# Patient Record
Sex: FEMALE | Race: WHITE | NOT HISPANIC OR LATINO | Employment: FULL TIME | URBAN - METROPOLITAN AREA
[De-identification: names, ages, dates, MRNs, and addresses within clinical notes are randomized per-mention and may not be internally consistent; named-entity substitution may affect disease eponyms.]

---

## 2022-07-20 ENCOUNTER — TELEPHONE (OUTPATIENT)
Dept: FAMILY MEDICINE CLINIC | Facility: CLINIC | Age: 32
End: 2022-07-20

## 2022-07-26 ENCOUNTER — OFFICE VISIT (OUTPATIENT)
Dept: FAMILY MEDICINE CLINIC | Facility: CLINIC | Age: 32
End: 2022-07-26
Payer: COMMERCIAL

## 2022-07-26 VITALS
HEART RATE: 96 BPM | BODY MASS INDEX: 26.5 KG/M2 | SYSTOLIC BLOOD PRESSURE: 120 MMHG | OXYGEN SATURATION: 98 % | HEIGHT: 64 IN | TEMPERATURE: 98.3 F | DIASTOLIC BLOOD PRESSURE: 76 MMHG | RESPIRATION RATE: 18 BRPM | WEIGHT: 155.2 LBS

## 2022-07-26 DIAGNOSIS — Z11.3 SCREEN FOR STD (SEXUALLY TRANSMITTED DISEASE): ICD-10-CM

## 2022-07-26 DIAGNOSIS — Z13.6 SCREENING FOR CARDIOVASCULAR, RESPIRATORY, AND GENITOURINARY DISEASES: ICD-10-CM

## 2022-07-26 DIAGNOSIS — Z00.00 WELL ADULT EXAM: Primary | ICD-10-CM

## 2022-07-26 DIAGNOSIS — Z13.83 SCREENING FOR CARDIOVASCULAR, RESPIRATORY, AND GENITOURINARY DISEASES: ICD-10-CM

## 2022-07-26 DIAGNOSIS — Z11.4 SCREENING FOR HIV (HUMAN IMMUNODEFICIENCY VIRUS): ICD-10-CM

## 2022-07-26 DIAGNOSIS — Z13.89 SCREENING FOR CARDIOVASCULAR, RESPIRATORY, AND GENITOURINARY DISEASES: ICD-10-CM

## 2022-07-26 DIAGNOSIS — G47.00 INSOMNIA, UNSPECIFIED TYPE: ICD-10-CM

## 2022-07-26 DIAGNOSIS — F10.10 ALCOHOL ABUSE: ICD-10-CM

## 2022-07-26 DIAGNOSIS — Z13.29 SCREENING FOR THYROID DISORDER: ICD-10-CM

## 2022-07-26 DIAGNOSIS — N92.6 MISSED PERIOD: ICD-10-CM

## 2022-07-26 DIAGNOSIS — F17.200 SMOKER: ICD-10-CM

## 2022-07-26 DIAGNOSIS — F19.90 ILLICIT DRUG USE: ICD-10-CM

## 2022-07-26 DIAGNOSIS — Z11.59 NEED FOR HEPATITIS C SCREENING TEST: ICD-10-CM

## 2022-07-26 DIAGNOSIS — Z12.4 SCREENING FOR CERVICAL CANCER: ICD-10-CM

## 2022-07-26 LAB — SL AMB POCT URINE HCG: NORMAL

## 2022-07-26 PROCEDURE — 81025 URINE PREGNANCY TEST: CPT | Performed by: FAMILY MEDICINE

## 2022-07-26 PROCEDURE — 3725F SCREEN DEPRESSION PERFORMED: CPT | Performed by: FAMILY MEDICINE

## 2022-07-26 PROCEDURE — 99385 PREV VISIT NEW AGE 18-39: CPT | Performed by: FAMILY MEDICINE

## 2022-07-26 RX ORDER — DIPHENHYDRAMINE HCL 25 MG
25 CAPSULE ORAL EVERY 6 HOURS PRN
COMMUNITY

## 2022-07-26 RX ORDER — TRAZODONE HYDROCHLORIDE 50 MG/1
50 TABLET ORAL
Qty: 30 TABLET | Refills: 0 | Status: SHIPPED | OUTPATIENT
Start: 2022-07-26

## 2022-07-26 NOTE — PROGRESS NOTES
FAMILY PRACTICE HEALTH MAINTENANCE OFFICE VISIT  [facility]    NAME: Daniel Gottlieb  AGE: 33 y o  SEX: female  : 1990     DATE: 2022    Assessment and Plan     1  Well adult exam    2  Need for hepatitis C screening test  -     Hepatitis C Antibody ([facility]); Future    3  Screening for HIV (human immunodeficiency virus)  -     [facility]- Human Immunodeficiency Virus 1/2 Antigen / Antibody ( Fourth Generation) with Reflex Testing; Future    4  Screening for cardiovascular, respiratory, and genitourinary diseases  -     CBC and differential; Future  -     Comprehensive metabolic panel; Future  -     Lipid Panel with Direct LDL reflex; Future    5  Screening for thyroid disorder  -     TSH, 3rd generation with Free T4 reflex; Future    6  Missed period  -     POCT urine HCG  -     Ambulatory referral to Obstetrics / Gynecology; Future    7  Screening for cervical cancer  -     Thinprep Pap and HR HPV DNA    8  Smoker  Assessment & Plan:  Smokes half pack/day  Not ready to quit  9  Screen for STD (sexually transmitted disease)  -     Chlamydia/GC amplified DNA by PCR; Future  -     Hepatitis B surface antigen; Future  -     HSV TYPE 1,2 DNA PCR; Future  -     RPR; Future    10  Alcohol abuse  Comments:  States she drinks 4 shots of liquor every night to sleep  Counseled on cessation  Will trial trazodone for insomnia as stated below  11  Insomnia, unspecified type  -     traZODone (DESYREL) 50 mg tablet; Take 1 tablet (50 mg total) by mouth daily at bedtime    12  Illicit drug use  Comments:  Uses edibles frequently  Counseled on cessation           Patient Counseling:   Nutrition: Stressed importance of a well balanced diet, moderation of sodium/saturated fat, caloric balance and sufficient intake of fiber  Exercise: Stressed the importance of regular exercise with a goal of 150 minutes per week  Dental Health: Discussed daily flossing and brushing and regular dental visits   Immunizations reviewed: Risks and Benefits discussed and Declined recommended vaccinations  Discussed benefits of:  Cervical Cancer screening and Screening labs  BMI Counseling: Body mass index is 26 64 kg/m²  Discussed with patient's BMI with her  The BMI is above normal  Nutrition recommendations include reducing portion sizes, decreasing overall calorie intake, 3-5 servings of fruits/vegetables daily, reducing fast food intake and consuming healthier snacks  Exercise recommendations include moderate aerobic physical activity for 150 minutes/week  No follow-ups on file  Chief Complaint     Chief Complaint   Patient presents with   Gabi Mckeon         History of Present Illness     HPI    Well Adult Physical   Patient here for a comprehensive physical exam       Diet and Physical Activity  Diet: well balanced diet  Exercise: daily      Depression Screen  PHQ-2/9 Depression Screening    Little interest or pleasure in doing things: 0 - not at all  Feeling down, depressed, or hopeless: 0 - not at all  PHQ-2 Score: 0  PHQ-2 Interpretation: Negative depression screen          General Health  Hearing: Normal:  bilateral  Vision: no vision problems  Dental: no dental visits for >1 year, brushes teeth twice daily and does not floss    Reproductive Health  Irregular Periods      The following portions of the patient's history were reviewed and updated as appropriate: allergies, current medications, past family history, past medical history, past social history, past surgical history and problem list     Review of Systems     Review of Systems   Constitutional: Negative  HENT: Negative  Eyes: Negative  Respiratory: Negative  Cardiovascular: Negative  Gastrointestinal: Negative  Endocrine: Negative  Genitourinary: Negative  Musculoskeletal: Negative  Skin: Negative  Allergic/Immunologic: Negative      Neurological: Negative  Hematological: Negative  Psychiatric/Behavioral: Positive for sleep disturbance  Past Medical History     History reviewed  No pertinent past medical history  Past Surgical History     History reviewed  No pertinent surgical history  Social History     Social History     Socioeconomic History    Marital status:      Spouse name: None    Number of children: None    Years of education: None    Highest education level: None   Occupational History    None   Tobacco Use    Smoking status: Current Every Day Smoker    Smokeless tobacco: Never Used   Substance and Sexual Activity    Alcohol use: Yes     Comment: daily    Drug use: Yes     Types: Other     Comment: edibles    Sexual activity: Not Currently   Other Topics Concern    None   Social History Narrative    None     Social Determinants of Health     Financial Resource Strain: Not on file   Food Insecurity: Not on file   Transportation Needs: Not on file   Physical Activity: Not on file   Stress: Not on file   Social Connections: Not on file   Intimate Partner Violence: Not on file   Housing Stability: Not on file       Family History     History reviewed  No pertinent family history  Current Medications       Current Outpatient Medications:     diphenhydrAMINE (BENADRYL) 25 mg capsule, Take 25 mg by mouth every 6 (six) hours as needed for itching, Disp: , Rfl:     NON FORMULARY, Clenbuterol, Disp: , Rfl:     NON FORMULARY, Anabar, Disp: , Rfl:     traZODone (DESYREL) 50 mg tablet, Take 1 tablet (50 mg total) by mouth daily at bedtime, Disp: 30 tablet, Rfl: 0     Allergies     No Known Allergies    Objective     /76   Pulse 96   Temp 98 3 °F (36 8 °C)   Resp 18   Ht 5' 4" (1 626 m)   Wt 70 4 kg (155 lb 3 2 oz)   LMP  (LMP Unknown)   SpO2 98%   BMI 26 64 kg/m²      Physical Exam  Constitutional:       General: She is not in acute distress  Appearance: Normal appearance  She is well-developed   She is not diaphoretic  HENT:      Head: Normocephalic and atraumatic  Right Ear: Tympanic membrane, ear canal and external ear normal  There is no impacted cerumen  Left Ear: Tympanic membrane, ear canal and external ear normal  There is no impacted cerumen  Eyes:      General: No scleral icterus  Right eye: No discharge  Left eye: No discharge  Extraocular Movements: Extraocular movements intact  Conjunctiva/sclera: Conjunctivae normal       Pupils: Pupils are equal, round, and reactive to light  Cardiovascular:      Rate and Rhythm: Normal rate and regular rhythm  Heart sounds: Normal heart sounds  No murmur heard  No friction rub  No gallop  Pulmonary:      Effort: Pulmonary effort is normal  No respiratory distress  Breath sounds: Normal breath sounds  No wheezing or rales  Chest:      Chest wall: No tenderness  Abdominal:      General: Bowel sounds are normal  There is no distension  Palpations: Abdomen is soft  There is no mass  Tenderness: There is no abdominal tenderness  There is no guarding or rebound  Musculoskeletal:         General: No deformity  Normal range of motion  Cervical back: Normal range of motion and neck supple  Skin:     General: Skin is warm and dry  Findings: No erythema or rash  Neurological:      Mental Status: She is alert and oriented to person, place, and time  Psychiatric:         Behavior: Behavior normal          Thought Content:  Thought content normal          Judgment: Judgment normal             Visual Acuity Screening    Right eye Left eye Both eyes   Without correction:      With correction: 20/15 20/20 20/50           MD K[facility]ARA [facility] DEPT  OF [facility]

## 2022-08-01 LAB
CLINICAL INFO: NORMAL
CYTO CVX: NORMAL
DATE PREVIOUS BX: NORMAL
HPV I/H RISK 1 DNA CVX QL PROBE+SIG AMP: NOT DETECTED
LMP START DATE: NORMAL
SL AMB PREV. PAP:: NORMAL
SPECIMEN SOURCE CVX/VAG CYTO: NORMAL

## 2022-08-08 LAB
ALBUMIN SERPL-MCNC: 4.7 G/DL (ref 3.8–4.8)
ALBUMIN/GLOB SERPL: 1.7 {RATIO} (ref 1.2–2.2)
ALP SERPL-CCNC: 37 IU/L (ref 44–121)
ALT SERPL-CCNC: 36 IU/L (ref 0–32)
AST SERPL-CCNC: 58 IU/L (ref 0–40)
BASOPHILS # BLD AUTO: 0.1 X10E3/UL (ref 0–0.2)
BASOPHILS NFR BLD AUTO: 1 %
BILIRUB SERPL-MCNC: 0.4 MG/DL (ref 0–1.2)
BUN SERPL-MCNC: 15 MG/DL (ref 6–20)
BUN/CREAT SERPL: 13 (ref 9–23)
C TRACH RRNA SPEC QL NAA+PROBE: NEGATIVE
CALCIUM SERPL-MCNC: 9.8 MG/DL (ref 8.7–10.2)
CHLORIDE SERPL-SCNC: 101 MMOL/L (ref 96–106)
CHOLEST SERPL-MCNC: 200 MG/DL (ref 100–199)
CO2 SERPL-SCNC: 23 MMOL/L (ref 20–29)
CREAT SERPL-MCNC: 1.19 MG/DL (ref 0.57–1)
EGFR: 63 ML/MIN/1.73
EOSINOPHIL # BLD AUTO: 0.1 X10E3/UL (ref 0–0.4)
EOSINOPHIL NFR BLD AUTO: 2 %
ERYTHROCYTE [DISTWIDTH] IN BLOOD BY AUTOMATED COUNT: 12.7 % (ref 11.7–15.4)
GLOBULIN SER-MCNC: 2.8 G/DL (ref 1.5–4.5)
GLUCOSE SERPL-MCNC: 86 MG/DL (ref 65–99)
HBV SURFACE AG SERPL QL IA: NEGATIVE
HCT VFR BLD AUTO: 45.2 % (ref 34–46.6)
HCV AB S/CO SERPL IA: <0.1 S/CO RATIO (ref 0–0.9)
HDLC SERPL-MCNC: 36 MG/DL
HGB BLD-MCNC: 15.1 G/DL (ref 11.1–15.9)
HIV 1+2 AB+HIV1 P24 AG SERPL QL IA: NON REACTIVE
HSV1 DNA SPEC QL NAA+PROBE: NEGATIVE
HSV2 DNA SPEC QL NAA+PROBE: NEGATIVE
IMM GRANULOCYTES # BLD: 0 X10E3/UL (ref 0–0.1)
IMM GRANULOCYTES NFR BLD: 0 %
LDLC SERPL CALC-MCNC: 153 MG/DL (ref 0–99)
LYMPHOCYTES # BLD AUTO: 2.4 X10E3/UL (ref 0.7–3.1)
LYMPHOCYTES NFR BLD AUTO: 28 %
MCH RBC QN AUTO: 31.5 PG (ref 26.6–33)
MCHC RBC AUTO-ENTMCNC: 33.4 G/DL (ref 31.5–35.7)
MCV RBC AUTO: 94 FL (ref 79–97)
MICRODELETION SYND BLD/T FISH: NORMAL
MONOCYTES # BLD AUTO: 0.8 X10E3/UL (ref 0.1–0.9)
MONOCYTES NFR BLD AUTO: 10 %
N GONORRHOEA RRNA SPEC QL NAA+PROBE: NEGATIVE
NEUTROPHILS # BLD AUTO: 5 X10E3/UL (ref 1.4–7)
NEUTROPHILS NFR BLD AUTO: 59 %
PLATELET # BLD AUTO: 330 X10E3/UL (ref 150–450)
POTASSIUM SERPL-SCNC: 4.7 MMOL/L (ref 3.5–5.2)
PROT SERPL-MCNC: 7.5 G/DL (ref 6–8.5)
RBC # BLD AUTO: 4.79 X10E6/UL (ref 3.77–5.28)
RPR SER QL: NON REACTIVE
SODIUM SERPL-SCNC: 139 MMOL/L (ref 134–144)
TRIGL SERPL-MCNC: 63 MG/DL (ref 0–149)
TSH SERPL DL<=0.005 MIU/L-ACNC: 0.85 UIU/ML (ref 0.45–4.5)
WBC # BLD AUTO: 8.4 X10E3/UL (ref 3.4–10.8)

## 2022-08-09 ENCOUNTER — TELEPHONE (OUTPATIENT)
Dept: FAMILY MEDICINE CLINIC | Facility: CLINIC | Age: 32
End: 2022-08-09

## 2022-08-10 ENCOUNTER — TELEPHONE (OUTPATIENT)
Dept: FAMILY MEDICINE CLINIC | Facility: CLINIC | Age: 32
End: 2022-08-10

## 2022-11-24 ENCOUNTER — HOSPITAL ENCOUNTER (EMERGENCY)
Facility: HOSPITAL | Age: 32
Discharge: HOME | End: 2022-11-24
Attending: EMERGENCY MEDICINE

## 2022-11-24 ENCOUNTER — APPOINTMENT (EMERGENCY)
Dept: RADIOLOGY | Facility: HOSPITAL | Age: 32
End: 2022-11-24

## 2022-11-24 VITALS
BODY MASS INDEX: 25.69 KG/M2 | OXYGEN SATURATION: 97 % | RESPIRATION RATE: 18 BRPM | HEART RATE: 72 BPM | HEIGHT: 63 IN | WEIGHT: 145 LBS | TEMPERATURE: 99.7 F | DIASTOLIC BLOOD PRESSURE: 69 MMHG | SYSTOLIC BLOOD PRESSURE: 129 MMHG

## 2022-11-24 DIAGNOSIS — F10.10 ALCOHOL ABUSE: ICD-10-CM

## 2022-11-24 DIAGNOSIS — E83.42 HYPOMAGNESEMIA: ICD-10-CM

## 2022-11-24 DIAGNOSIS — F10.939 ALCOHOL WITHDRAWAL (HCC): Primary | ICD-10-CM

## 2022-11-24 LAB
ALBUMIN SERPL BCP-MCNC: 3.4 G/DL (ref 3.5–5)
ALP SERPL-CCNC: 75 U/L (ref 46–116)
ALT SERPL W P-5'-P-CCNC: 67 U/L (ref 12–78)
AMPHETAMINES SERPL QL SCN: NEGATIVE
ANION GAP SERPL CALCULATED.3IONS-SCNC: 6 MMOL/L (ref 4–13)
APTT PPP: 31 SECONDS (ref 23–37)
BARBITURATES UR QL: NEGATIVE
BASOPHILS # BLD AUTO: 0.03 THOUSANDS/ÂΜL (ref 0–0.1)
BASOPHILS NFR BLD AUTO: 0 % (ref 0–1)
BENZODIAZ UR QL: NEGATIVE
BILIRUB SERPL-MCNC: 0.27 MG/DL (ref 0.2–1)
BUN SERPL-MCNC: 9 MG/DL (ref 5–25)
CALCIUM ALBUM COR SERPL-MCNC: 9 MG/DL (ref 8.3–10.1)
CALCIUM SERPL-MCNC: 8.5 MG/DL (ref 8.3–10.1)
CHLORIDE SERPL-SCNC: 101 MMOL/L (ref 96–108)
CO2 SERPL-SCNC: 31 MMOL/L (ref 21–32)
COCAINE UR QL: NEGATIVE
CREAT SERPL-MCNC: 0.73 MG/DL (ref 0.6–1.3)
EOSINOPHIL # BLD AUTO: 0.06 THOUSAND/ÂΜL (ref 0–0.61)
EOSINOPHIL NFR BLD AUTO: 1 % (ref 0–6)
ERYTHROCYTE [DISTWIDTH] IN BLOOD BY AUTOMATED COUNT: 11.9 % (ref 11.6–15.1)
ETHANOL SERPL-MCNC: 61 MG/DL (ref 0–3)
EXT PREGNANCY TEST URINE: NEGATIVE
EXT. CONTROL: NORMAL
GFR SERPL CREATININE-BSD FRML MDRD: 110 ML/MIN/1.73SQ M
GLUCOSE SERPL-MCNC: 87 MG/DL (ref 65–140)
HCT VFR BLD AUTO: 41.9 % (ref 34.8–46.1)
HGB BLD-MCNC: 13.8 G/DL (ref 11.5–15.4)
IMM GRANULOCYTES # BLD AUTO: 0.02 THOUSAND/UL (ref 0–0.2)
IMM GRANULOCYTES NFR BLD AUTO: 0 % (ref 0–2)
INR PPP: 0.91 (ref 0.84–1.19)
LIPASE SERPL-CCNC: 249 U/L (ref 73–393)
LYMPHOCYTES # BLD AUTO: 2.35 THOUSANDS/ÂΜL (ref 0.6–4.47)
LYMPHOCYTES NFR BLD AUTO: 27 % (ref 14–44)
MAGNESIUM SERPL-MCNC: 1.4 MG/DL (ref 1.6–2.6)
MCH RBC QN AUTO: 32.4 PG (ref 26.8–34.3)
MCHC RBC AUTO-ENTMCNC: 32.9 G/DL (ref 31.4–37.4)
MCV RBC AUTO: 98 FL (ref 82–98)
METHADONE UR QL: NEGATIVE
MONOCYTES # BLD AUTO: 0.8 THOUSAND/ÂΜL (ref 0.17–1.22)
MONOCYTES NFR BLD AUTO: 9 % (ref 4–12)
NEUTROPHILS # BLD AUTO: 5.32 THOUSANDS/ÂΜL (ref 1.85–7.62)
NEUTS SEG NFR BLD AUTO: 63 % (ref 43–75)
NRBC BLD AUTO-RTO: 0 /100 WBCS
OPIATES UR QL SCN: NEGATIVE
OXYCODONE+OXYMORPHONE UR QL SCN: NEGATIVE
PCP UR QL: NEGATIVE
PLATELET # BLD AUTO: 136 THOUSANDS/UL (ref 149–390)
PMV BLD AUTO: 10.3 FL (ref 8.9–12.7)
POTASSIUM SERPL-SCNC: 3.6 MMOL/L (ref 3.5–5.3)
PROT SERPL-MCNC: 7 G/DL (ref 6.4–8.4)
PROTHROMBIN TIME: 12.4 SECONDS (ref 11.6–14.5)
RBC # BLD AUTO: 4.26 MILLION/UL (ref 3.81–5.12)
SODIUM SERPL-SCNC: 138 MMOL/L (ref 135–147)
THC UR QL: NEGATIVE
WBC # BLD AUTO: 8.58 THOUSAND/UL (ref 4.31–10.16)

## 2022-11-24 RX ORDER — MAGNESIUM SULFATE HEPTAHYDRATE 40 MG/ML
2 INJECTION, SOLUTION INTRAVENOUS ONCE
Status: COMPLETED | OUTPATIENT
Start: 2022-11-24 | End: 2022-11-24

## 2022-11-24 RX ORDER — CHLORDIAZEPOXIDE HYDROCHLORIDE 25 MG/1
50 CAPSULE, GELATIN COATED ORAL ONCE
Status: COMPLETED | OUTPATIENT
Start: 2022-11-24 | End: 2022-11-24

## 2022-11-24 RX ORDER — CHLORDIAZEPOXIDE HYDROCHLORIDE 25 MG/1
50 CAPSULE, GELATIN COATED ORAL 3 TIMES DAILY PRN
Qty: 12 CAPSULE | Refills: 0 | Status: SHIPPED | OUTPATIENT
Start: 2022-11-24 | End: 2022-12-06

## 2022-11-24 RX ADMIN — CHLORDIAZEPOXIDE HYDROCHLORIDE 50 MG: 25 CAPSULE ORAL at 20:21

## 2022-11-24 RX ADMIN — MAGNESIUM SULFATE HEPTAHYDRATE 2 G: 40 INJECTION, SOLUTION INTRAVENOUS at 19:19

## 2022-11-24 RX ADMIN — IOHEXOL 100 ML: 350 INJECTION, SOLUTION INTRAVENOUS at 19:11

## 2022-11-24 RX ADMIN — SODIUM CHLORIDE 1000 ML: 0.9 INJECTION, SOLUTION INTRAVENOUS at 19:12

## 2022-11-25 LAB
ATRIAL RATE: 72 BPM
P AXIS: 3 DEGREES
PR INTERVAL: 128 MS
QRS AXIS: 85 DEGREES
QRSD INTERVAL: 92 MS
QT INTERVAL: 434 MS
QTC INTERVAL: 475 MS
T WAVE AXIS: 52 DEGREES
VENTRICULAR RATE: 72 BPM

## 2022-11-25 NOTE — DISCHARGE INSTRUCTIONS
Return to the ER for further concerns or worsening symptoms  Follow up with your primary care physician in 1-2 days  Take medication as prescribed  Follow up with outpatient referrals provided

## 2022-11-25 NOTE — ED PROVIDER NOTES
History  Chief Complaint   Patient presents with   • Withdrawal - Alcohol     States she is in withdrawal from alcohol, last drink being last night     Patient is a 81-year-old female with a history of alcohol dependence  She states she typically has 5 shots of vodka every night  Patient admits to recent 1 week binge, during which she drank alcohol for morning tonight  Most recent drink was last night  Patient now presents with complaint of also withdrawal and is concerned that she might be having a heart attack  Patient thinks her heart might stop  She admits to having some tremors  Patient took 2 tablets of Xanax which she procured from her friend, and admits that her symptoms have improved at the time of initial evaluation  Patient complains of epigastric/bilateral upper quadrant abdominal pain  She states that she is unsure if she fell yesterday while drinking  Patient states that she drinks alcohol because of insomnia would like something to help her sleep  During my initial evaluation, patient refuses inpatient rehab/detox  She states that she is going to enroll in a program from her job I would like to try that first        History provided by:  Patient   used: No    Withdrawal - Alcohol  Similar prior episodes: yes    Severity:  Moderate  Onset quality:  Unable to specify  Timing:  Constant  Chronicity:  New  Suspected agents:  Alcohol  Associated symptoms: abdominal pain    Associated symptoms: no nausea, no shortness of breath and no vomiting    Abdominal pain:     Location:  Epigastric, RUQ and LUQ    Quality: aching      Severity:  Moderate    Onset quality:  Unable to specify    Timing:  Constant    Chronicity:  New      Prior to Admission Medications   Prescriptions Last Dose Informant Patient Reported? Taking?    NON FORMULARY   Yes No   Sig: Clenbuterol   NON FORMULARY   Yes No   Sig: Anabar   diphenhydrAMINE (BENADRYL) 25 mg capsule   Yes No   Sig: Take 25 mg by mouth every 6 (six) hours as needed for itching   traZODone (DESYREL) 50 mg tablet   No No   Sig: Take 1 tablet (50 mg total) by mouth daily at bedtime      Facility-Administered Medications: None       History reviewed  No pertinent past medical history  History reviewed  No pertinent surgical history  History reviewed  No pertinent family history  I have reviewed and agree with the history as documented  E-Cigarette/Vaping     E-Cigarette/Vaping Substances     Social History     Tobacco Use   • Smoking status: Every Day   • Smokeless tobacco: Never   Substance Use Topics   • Alcohol use: Yes     Comment: daily   • Drug use: Yes     Types: Other     Comment: edibles       Review of Systems   Constitutional: Negative for chills and fever  Respiratory: Negative for cough, chest tightness and shortness of breath  Gastrointestinal: Positive for abdominal pain  Negative for diarrhea, nausea and vomiting  Genitourinary: Negative for dysuria, frequency, hematuria and urgency  Musculoskeletal: Negative for back pain, neck pain and neck stiffness  Neurological: Positive for tremors  Psychiatric/Behavioral: The patient is nervous/anxious  All other systems reviewed and are negative  Physical Exam  Physical Exam  Vitals and nursing note reviewed  Constitutional:       General: She is not in acute distress  Appearance: She is well-developed and well-nourished  She is not diaphoretic  HENT:      Head: Normocephalic and atraumatic  Eyes:      Extraocular Movements: Extraocular movements intact  Conjunctiva/sclera: Conjunctivae normal       Pupils: Pupils are equal, round, and reactive to light  Cardiovascular:      Rate and Rhythm: Normal rate and regular rhythm  Heart sounds: Normal heart sounds  No murmur heard  Pulmonary:      Effort: Pulmonary effort is normal  No respiratory distress  Breath sounds: Normal breath sounds     Abdominal:      General: Bowel sounds are normal  There is no distension  Palpations: Abdomen is soft  Tenderness: There is abdominal tenderness in the right upper quadrant and left upper quadrant  Musculoskeletal:         General: No deformity or edema  Normal range of motion  Cervical back: Normal range of motion and neck supple  Skin:     General: Skin is warm and dry  Capillary Refill: Capillary refill takes less than 2 seconds  Coloration: Skin is not pale  Findings: No rash  Neurological:      General: No focal deficit present  Mental Status: She is alert and oriented to person, place, and time  Cranial Nerves: No cranial nerve deficit     Psychiatric:         Mood and Affect: Mood and affect normal          Behavior: Behavior normal          Vital Signs  ED Triage Vitals   Temperature Pulse Respirations Blood Pressure SpO2   11/24/22 1758 11/24/22 1758 11/24/22 1758 11/24/22 1800 11/24/22 1758   99 7 °F (37 6 °C) 95 18 (!) 131/104 96 %      Temp src Heart Rate Source Patient Position - Orthostatic VS BP Location FiO2 (%)   -- 11/24/22 1911 11/24/22 1911 11/24/22 1911 --    Monitor Sitting Left arm       Pain Score       11/24/22 1758       No Pain           Vitals:    11/24/22 1911 11/24/22 1945 11/24/22 2000 11/24/22 2015   BP: 121/83 117/73  129/69   Pulse: 76 78 76 72   Patient Position - Orthostatic VS: Sitting Lying  Lying         Visual Acuity  Visual Acuity    Flowsheet Row Most Recent Value   L Pupil Size (mm) 3   R Pupil Size (mm) 3          ED Medications  Medications   iohexol (OMNIPAQUE) 350 MG/ML injection (SINGLE-DOSE) 100 mL (100 mL Intravenous Given 11/24/22 1911)   magnesium sulfate 2 g/50 mL IVPB (premix) 2 g (0 g Intravenous Stopped 11/24/22 2025)   sodium chloride 0 9 % bolus 1,000 mL (0 mL Intravenous Stopped 11/24/22 2021)   chlordiazePOXIDE (LIBRIUM) capsule 50 mg (50 mg Oral Given 11/24/22 2021)       Diagnostic Studies  Results Reviewed     Procedure Component Value Units Date/Time Lipase [[id number]]  (Normal) Collected: 11/24/22 1827    Lab Status: Final result Specimen: Blood from Arm, Right Updated: 11/24/22 1910     Lipase 249 u/L     Comprehensive metabolic panel [[id number]]  (Abnormal) Collected: 11/24/22 1827    Lab Status: Final result Specimen: Blood from Arm, Right Updated: 11/24/22 1858     Sodium 138 mmol/L      Potassium 3 6 mmol/L      Chloride 101 mmol/L      CO2 31 mmol/L      ANION GAP 6 mmol/L      BUN 9 mg/dL      Creatinine 0 73 mg/dL      Glucose 87 mg/dL      Calcium 8 5 mg/dL      Corrected Calcium 9 0 mg/dL      AST --     ALT 67 U/L      Alkaline Phosphatase 75 U/L      Total Protein 7 0 g/dL      Albumin 3 4 g/dL      Total Bilirubin 0 27 mg/dL      eGFR 110 ml/min/1 73sq m     Narrative:      [place] guidelines for Chronic Kidney Disease (CKD):   •  Stage 1 with normal or high GFR (GFR > 90 mL/min/1 73 square meters)  •  Stage 2 Mild CKD (GFR = 60-89 mL/min/1 73 square meters)  •  Stage 3A Moderate CKD (GFR = 45-59 mL/min/1 73 square meters)  •  Stage 3B Moderate CKD (GFR = 30-44 mL/min/1 73 square meters)  •  Stage 4 Severe CKD (GFR = 15-29 mL/min/1 73 square meters)  •  Stage 5 End Stage CKD (GFR <15 mL/min/1 73 square meters)  Note: GFR calculation is accurate only with a steady state creatinine    Magnesium [[id number]]  (Abnormal) Collected: 11/24/22 1827    Lab Status: Final result Specimen: Blood from Arm, Right Updated: 11/24/22 1858     Magnesium 1 4 mg/dL     Ethanol [[id number]]  (Abnormal) Collected: 11/24/22 1827    Lab Status: Final result Specimen: Blood from Arm, Right Updated: 11/24/22 1855     Ethanol Lvl 61 mg/dL     Protime-INR [[id number]]  (Normal) Collected: 11/24/22 1827    Lab Status: Final result Specimen: Blood from Arm, Right Updated: 11/24/22 1853     Protime 12 4 seconds      INR 0 91    APTT [[id number]]  (Normal) Collected: 11/24/22 1827    Lab Status: Final result Specimen: Blood from Arm, Right Updated: 11/24/22 1853     PTT 31 seconds     Rapid drug screen, urine [[id number]]  (Normal) Collected: 11/24/22 1827    Lab Status: Final result Specimen: Urine, Clean Catch Updated: 11/24/22 1851     Amph/Meth UR Negative     Barbiturate Ur Negative     Benzodiazepine Urine Negative     Cocaine Urine Negative     Methadone Urine Negative     Opiate Urine Negative     PCP Ur Negative     THC Urine Negative     Oxycodone Urine Negative    Narrative:      FOR MEDICAL PURPOSES ONLY  IF CONFIRMATION NEEDED PLEASE CONTACT THE LAB WITHIN 5 DAYS  Drug Screen Cutoff Levels:  AMPHETAMINE/METHAMPHETAMINES  1000 ng/mL  BARBITURATES     200 ng/mL  BENZODIAZEPINES     200 ng/mL  COCAINE      300 ng/mL  METHADONE      300 ng/mL  OPIATES      300 ng/mL  PHENCYCLIDINE     25 ng/mL  THC       50 ng/mL  OXYCODONE      100 ng/mL    CBC and differential [[id number]]  (Abnormal) Collected: 11/24/22 1827    Lab Status: Final result Specimen: Blood from Arm, Right Updated: 11/24/22 1844     WBC 8 58 Thousand/uL      RBC 4 26 Million/uL      Hemoglobin 13 8 g/dL      Hematocrit 41 9 %      MCV 98 fL      MCH 32 4 pg      MCHC 32 9 g/dL      RDW 11 9 %      MPV 10 3 fL      Platelets 689 Thousands/uL      nRBC 0 /100 WBCs      Neutrophils Relative 63 %      Immat GRANS % 0 %      Lymphocytes Relative 27 %      Monocytes Relative 9 %      Eosinophils Relative 1 %      Basophils Relative 0 %      Neutrophils Absolute 5 32 Thousands/µL      Immature Grans Absolute 0 02 Thousand/uL      Lymphocytes Absolute 2 35 Thousands/µL      Monocytes Absolute 0 80 Thousand/µL      Eosinophils Absolute 0 06 Thousand/µL      Basophils Absolute 0 03 Thousands/µL     POCT pregnancy, urine [[id number]]  (Normal) Resulted: 11/24/22 1829    Lab Status: Final result Updated: 11/24/22 1830     EXT Preg Test, Ur Negative     Control Valid                 CT head without contrast   Final Result by Glory Easley MD (11/24 1920)      No acute intracranial abnormality  Workstation performed: [id number]         CT chest abdomen pelvis w contrast   Final Result by Nick Gold MD (11/24 1924)      No acute traumatic CT findings  Hepatic steatosis  Workstation performed: [id number]                    Procedures  ECG 12 Lead Documentation Only    Date/Time: 11/24/2022 7:20 PM  Performed by: Jenifer Trent DO  Authorized by: Jenifer Trent DO     Indications / Diagnosis:  ETOH abuse  ECG reviewed by me, the ED Provider: yes    Patient location:  ED  Previous ECG:     Previous ECG:  Unavailable    Comparison to cardiac monitor: Yes    Interpretation:     Interpretation: non-specific    Rate:     ECG rate:  72bpm    ECG rate assessment: normal    Rhythm:     Rhythm: sinus rhythm    Ectopy:     Ectopy: none    QRS:     QRS axis:  Normal  Conduction:     Conduction: normal    ST segments:     ST segments:  Normal  T waves:     T waves: normal    Other findings:     Other findings: prolonged qTc interval               ED Course                               SBIRT 20yo+    Flowsheet Row Most Recent Value   SBIRT (25 yo +)    In order to provide better care to our patients, we are screening all of our patients for alcohol and drug use  Would it be okay to ask you these screening questions?  No Filed at: 11/24/2022 2011                    [facility]  Number of Diagnoses or Management Options  Alcohol abuse: new and requires workup  Alcohol withdrawal ([facility] [address] ): new and requires workup  Hypomagnesemia: new and requires workup     Amount and/or Complexity of Data Reviewed  Clinical lab tests: ordered and reviewed  Tests in the radiology section of CPT®: ordered and reviewed    Risk of Complications, Morbidity, and/or Mortality  Presenting problems: high  Diagnostic procedures: high  Management options: high    Patient Progress  Patient progress: improved      Disposition  Final diagnoses:   Alcohol withdrawal ([facility] [address] )   Hypomagnesemia   Alcohol abuse     Time reflects when diagnosis was documented in both MDM as applicable and the Disposition within this note     Time User Action Codes Description Comment    11/24/2022  7:57 PM JAIMEjewels Rios Add [F10 939] Alcohol withdrawal ([address] )     11/24/2022  7:57 PM Milagro Rios Add [E83 42] Hypomagnesemia     11/24/2022  7:57 PM Milagro Rios Add [F10 10] Alcohol abuse       ED Disposition     ED Disposition   Discharge    Condition   Stable    Date/Time   Thu Nov 24, 2022  7:57 PM    Comment   Gabriel Harborton discharge to home/self care  Follow-up Information     Follow up With Specialties Details Why Contact Info    Ayse Purcell MD [facility] Schedule an appointment as soon as possible for a visit in 2 days for follow up [address]  [phone]            Discharge Medication List as of 11/24/2022  8:05 PM      START taking these medications    Details   chlordiazePOXIDE (LIBRIUM) 25 mg capsule Take 2 capsules (50 mg total) by mouth 3 (three) times a day as needed for anxiety or withdrawal for up to 3 days, Starting Thu 11/24/2022, Until Sun 11/27/2022 at 2359, Normal         CONTINUE these medications which have NOT CHANGED    Details   diphenhydrAMINE (BENADRYL) 25 mg capsule Take 25 mg by mouth every 6 (six) hours as needed for itching, Historical Med      !! NON FORMULARY Clenbuterol, Historical Med      !! NON FORMULARY Anabar, Historical Med      traZODone (DESYREL) 50 mg tablet Take 1 tablet (50 mg total) by mouth daily at bedtime, Starting Tue 7/26/2022, Normal       !! - Potential duplicate medications found  Please discuss with provider  No discharge procedures on file      PDMP Review     None          ED Provider  Electronically Signed by           Coral José DO  11/24/22 7699

## 2022-12-06 ENCOUNTER — OFFICE VISIT (OUTPATIENT)
Dept: FAMILY MEDICINE CLINIC | Facility: CLINIC | Age: 32
End: 2022-12-06

## 2022-12-06 VITALS
HEART RATE: 84 BPM | WEIGHT: 147 LBS | BODY MASS INDEX: 26.05 KG/M2 | SYSTOLIC BLOOD PRESSURE: 108 MMHG | DIASTOLIC BLOOD PRESSURE: 64 MMHG | TEMPERATURE: 97.6 F | OXYGEN SATURATION: 99 % | RESPIRATION RATE: 16 BRPM | HEIGHT: 63 IN

## 2022-12-06 DIAGNOSIS — G47.00 INSOMNIA, UNSPECIFIED TYPE: ICD-10-CM

## 2022-12-06 DIAGNOSIS — F41.1 GENERALIZED ANXIETY DISORDER: Primary | ICD-10-CM

## 2022-12-06 DIAGNOSIS — F10.10 ALCOHOL ABUSE: ICD-10-CM

## 2022-12-06 RX ORDER — HYDROXYZINE PAMOATE 50 MG/1
50 CAPSULE ORAL
Qty: 30 CAPSULE | Refills: 3 | Status: SHIPPED | OUTPATIENT
Start: 2022-12-06

## 2022-12-06 RX ORDER — ESCITALOPRAM OXALATE 10 MG/1
10 TABLET ORAL DAILY
Qty: 30 TABLET | Refills: 3 | Status: SHIPPED | OUTPATIENT
Start: 2022-12-06

## 2022-12-06 NOTE — PROGRESS NOTES
Assessment/Plan:    1  Generalized anxiety disorder  Assessment & Plan:  Pt was previously prescribed this, however only took this for a few days  Discussed that her insomnia is most likely secondary to untreated anxiety and could significantly improve with proper management  Agreeable to taking Lexapro, RTO 6 weeks for med check    Orders:  -     escitalopram (Lexapro) 10 mg tablet; Take 1 tablet (10 mg total) by mouth daily    2  Insomnia, unspecified type  -     hydrOXYzine pamoate (VISTARIL) 50 mg capsule; Take 1 capsule (50 mg total) by mouth daily at bedtime    3  Alcohol abuse  Assessment & Plan:  Reports ETOH is strictly to help her sleep, however did binge drink prior to ER eval    She is in the process of enrolling in [employer] through her employer  Also gave information for online medication management for ETOH abuse  Patient Instructions   [employer]          Return in about 6 weeks (around 1/17/2023)  Subjective:      Patient ID: Obed Phipps is a 33 y o  female  Chief Complaint   Patient presents with   • Follow-up     Would like to discuss anxiety and sleep meds  juan manuel       Pt drank heavily 6 years ago  Has been exercises frequenctly   Takes Benadryl and was taking Trazodone, tried to increase to 100mg, which didn't help  Has tried Ambien prior  Had withdrawls  Was drinking about 5 shots of liquor  Will be starting a counseling program through her employer  The following portions of the patient's history were reviewed and updated as appropriate: allergies, current medications, past family history, past medical history, past social history, past surgical history and problem list     Review of Systems   Constitutional: Negative  Respiratory: Negative  Cardiovascular: Negative  Gastrointestinal: Negative  Neurological: Negative  Psychiatric/Behavioral: Positive for sleep disturbance          See HPI         Current Outpatient Medications   Medication Sig Dispense Refill   • escitalopram (Lexapro) 10 mg tablet Take 1 tablet (10 mg total) by mouth daily 30 tablet 3   • hydrOXYzine pamoate (VISTARIL) 50 mg capsule Take 1 capsule (50 mg total) by mouth daily at bedtime 30 capsule 3     No current facility-administered medications for this visit  Objective:    /64   Pulse 84   Temp 97 6 °F (36 4 °C)   Resp 16   Ht 5' 3" (1 6 m)   Wt 66 7 kg (147 lb)   LMP 11/23/2022 (Approximate)   SpO2 99%   BMI 26 04 kg/m²        Physical Exam  Vitals and nursing note reviewed  Constitutional:       Appearance: Normal appearance  She is well-developed  Cardiovascular:      Rate and Rhythm: Normal rate and regular rhythm  Heart sounds: Normal heart sounds  No murmur heard  Pulmonary:      Effort: Pulmonary effort is normal       Breath sounds: Normal breath sounds  Skin:     General: Skin is warm and dry  Neurological:      Mental Status: She is alert     Psychiatric:         Mood and Affect: Mood normal          Behavior: Behavior normal                 Morna Squibb, CRNP

## 2022-12-06 NOTE — ASSESSMENT & PLAN NOTE
Reports ETOH is strictly to help her sleep, however did binge drink prior to ER eval    She is in the process of enrolling in [employer] through her employer  Also gave information for online medication management for ETOH abuse

## 2022-12-06 NOTE — ASSESSMENT & PLAN NOTE
Pt was previously prescribed this, however only took this for a few days  Discussed that her insomnia is most likely secondary to untreated anxiety and could significantly improve with proper management    Agreeable to taking Lexapro, RTO 6 weeks for med check

## 2022-12-07 ENCOUNTER — TELEPHONE (OUTPATIENT)
Dept: OBGYN CLINIC | Facility: CLINIC | Age: 32
End: 2022-12-07

## 2022-12-07 NOTE — TELEPHONE ENCOUNTER
Spoke to patient, she said she has an outbreak of white sores on the outside labia of her vagina  I scheduled her for Friday

## 2022-12-07 NOTE — TELEPHONE ENCOUNTER
Pt lmom - had an outbreak and currently trying to see if any cancellations to get in to office tomorrow for appt

## 2022-12-09 ENCOUNTER — OFFICE VISIT (OUTPATIENT)
Dept: OBGYN CLINIC | Facility: CLINIC | Age: 32
End: 2022-12-09

## 2022-12-09 VITALS
DIASTOLIC BLOOD PRESSURE: 74 MMHG | HEIGHT: 64 IN | WEIGHT: 149 LBS | BODY MASS INDEX: 25.44 KG/M2 | SYSTOLIC BLOOD PRESSURE: 116 MMHG

## 2022-12-09 DIAGNOSIS — Z11.3 SCREEN FOR STD (SEXUALLY TRANSMITTED DISEASE): ICD-10-CM

## 2022-12-09 DIAGNOSIS — N89.8 VAGINAL LESION: Primary | ICD-10-CM

## 2022-12-09 DIAGNOSIS — Z11.3 ROUTINE SCREENING FOR STI (SEXUALLY TRANSMITTED INFECTION): ICD-10-CM

## 2022-12-09 NOTE — PROGRESS NOTES
Assessment/Plan:    Reviewed with the patient regarding safe sex practices and to utilize condoms to prevent STD/STI  Discussed different diagnoses of possible vaginal lesions including but not limited to folliculitis and HSV  Reviewed HPV and genital warts with patient in regards to her questions and concerns  Patient desired blood work to test for HSV1/2  Blood work ordered today  Patient desired STI testing along  HSV culture taken today on the current healing lesions  Educated patient that the office would follow up with her results and advised her in the treatment of lotions and emollients in the case of folliculitis along with proper shaving technique  Advised patient that since lesions are almost healed Valtrex is not necessary at this time but if further outbreaks occur and cultures are + for HSV, to call the office for evaluation or script  Problem List Items Addressed This Visit    None  Visit Diagnoses     Vaginal lesion    -  Primary    Screen for STD (sexually transmitted disease)        Relevant Orders    Herpes I/II IgG BALDO w Reflex to HSV-2    Herpes simplex virus culture    Routine screening for STI (sexually transmitted infection)        Relevant Orders    Chlamydia/GC amplified DNA by PCR    Trichomonas Vaginalis, SMITA            Subjective:      Patient ID: Analilia Villarreal is a 33 y o  female  Patient reports to the office for a vaginal outbreak after sexual intercourse  She notices these bumps the next day following intercourse  She describes as the bumps as 'whiteheads' or small pustules that are mildly tender but not painful  She was tested after her first outbreak HSV, G/C, which were negative in 08/2022  She has a history of STI about 13 years ago but does not remember which STI  She is currently sexually active with one partner  Partner has history of HSV  She does not utilize condoms or BC  Denies history of oral herpes  Denies urinary and vaginal symptoms         The following portions of the patient's history were reviewed and updated as appropriate:   She  has no past medical history on file  She   Patient Active Problem List    Diagnosis Date Noted   • Generalized anxiety disorder 12/06/2022   • Smoker 07/26/2022   • Alcohol abuse 35/50/3198   • Illicit drug use 84/44/2805   • Insomnia 07/26/2022     She  has no past surgical history on file  Her family history is not on file  She  reports that she quit smoking about 11 months ago  Her smoking use included cigarettes  She has never used smokeless tobacco  She reports that she does not currently use alcohol  She reports current drug use  Drug: Other  Current Outpatient Medications   Medication Sig Dispense Refill   • escitalopram (Lexapro) 10 mg tablet Take 1 tablet (10 mg total) by mouth daily 30 tablet 3   • hydrOXYzine pamoate (VISTARIL) 50 mg capsule Take 1 capsule (50 mg total) by mouth daily at bedtime 30 capsule 3     No current facility-administered medications for this visit  Current Outpatient Medications on File Prior to Visit   Medication Sig   • escitalopram (Lexapro) 10 mg tablet Take 1 tablet (10 mg total) by mouth daily   • hydrOXYzine pamoate (VISTARIL) 50 mg capsule Take 1 capsule (50 mg total) by mouth daily at bedtime     No current facility-administered medications on file prior to visit  She has No Known Allergies       Review of Systems   Constitutional: Negative for chills, fatigue and fever  Respiratory: Negative for shortness of breath  Cardiovascular: Negative for chest pain and palpitations  Gastrointestinal: Negative for abdominal pain, constipation and diarrhea  Genitourinary: Positive for genital sores  Negative for decreased urine volume, dyspareunia, dysuria, flank pain, frequency, menstrual problem, pelvic pain, urgency, vaginal bleeding, vaginal discharge and vaginal pain  Skin: Negative for rash  Neurological: Negative for numbness and headaches  Objective:      /74 (BP Location: Left arm, Patient Position: Sitting, Cuff Size: Standard)   Ht 5' 4" (1 626 m)   Wt 67 6 kg (149 lb)   LMP 11/23/2022 (Approximate)   BMI 25 58 kg/m²          Physical Exam  Vitals and nursing note reviewed  Constitutional:       Appearance: Normal appearance  She is normal weight  She is not toxic-appearing  Eyes:      Conjunctiva/sclera: Conjunctivae normal    Cardiovascular:      Rate and Rhythm: Normal rate and regular rhythm  Heart sounds: Normal heart sounds  Pulmonary:      Effort: Pulmonary effort is normal  No respiratory distress  Breath sounds: Normal breath sounds  Abdominal:      General: Abdomen is flat  Palpations: Abdomen is soft  Tenderness: There is no abdominal tenderness  There is no right CVA tenderness or left CVA tenderness  Genitourinary:     Exam position: Lithotomy position  Pubic Area: No rash or pubic lice  Labia:         Right: No rash, tenderness or lesion  Left: No rash, tenderness or lesion  Urethra: No urethral pain or urethral lesion  Vagina: Normal  No vaginal discharge  Cervix: No cervical motion tenderness or erythema  Uterus: Normal        Adnexa: Right adnexa normal and left adnexa normal         Right: No tenderness  Left: No tenderness  Comments: Cluster of flat, small, reddened lesions that are crusted over and in the process of healing  Musculoskeletal:         General: Normal range of motion  Cervical back: Normal range of motion  Right lower leg: No edema  Left lower leg: No edema  Lymphadenopathy:      Lower Body: No right inguinal adenopathy  No left inguinal adenopathy  Skin:     General: Skin is warm and dry  Neurological:      Mental Status: She is alert and oriented to person, place, and time     Psychiatric:         Mood and Affect: Mood normal          Behavior: Behavior normal          Thought Content: Thought content normal          Judgment: Judgment normal

## 2022-12-10 LAB
C TRACH RRNA SPEC QL NAA+PROBE: NOT DETECTED
N GONORRHOEA RRNA SPEC QL NAA+PROBE: NOT DETECTED
T VAGINALIS RRNA SPEC QL NAA+PROBE: NOT DETECTED

## 2022-12-15 ENCOUNTER — TELEPHONE (OUTPATIENT)
Dept: OBGYN CLINIC | Facility: CLINIC | Age: 32
End: 2022-12-15

## 2023-02-28 ENCOUNTER — HOSPITAL ENCOUNTER (OUTPATIENT)
Facility: HOSPITAL | Age: 33
Setting detail: OBSERVATION
Discharge: HOME | End: 2023-03-03
Attending: EMERGENCY MEDICINE | Admitting: INTERNAL MEDICINE

## 2023-02-28 ENCOUNTER — APPOINTMENT (EMERGENCY)
Dept: RADIOLOGY | Facility: HOSPITAL | Age: 33
End: 2023-02-28

## 2023-02-28 DIAGNOSIS — F10.10 ALCOHOL ABUSE: ICD-10-CM

## 2023-02-28 DIAGNOSIS — R56.9 NEW ONSET SEIZURE (HCC): Primary | ICD-10-CM

## 2023-02-28 DIAGNOSIS — F10.982 ALCOHOL-INDUCED INSOMNIA (HCC): ICD-10-CM

## 2023-02-28 LAB
2HR DELTA HS TROPONIN: 3 NG/L
ALBUMIN SERPL BCP-MCNC: 4.2 G/DL (ref 3.5–5)
ALP SERPL-CCNC: 42 U/L (ref 34–104)
ALT SERPL W P-5'-P-CCNC: 43 U/L (ref 7–52)
AMPHETAMINES SERPL QL SCN: NEGATIVE
ANION GAP SERPL CALCULATED.3IONS-SCNC: 6 MMOL/L (ref 4–13)
APAP SERPL-MCNC: <10 UG/ML (ref 10–20)
APTT PPP: 27 SECONDS (ref 23–37)
AST SERPL W P-5'-P-CCNC: 41 U/L (ref 13–39)
BARBITURATES UR QL: NEGATIVE
BASOPHILS # BLD AUTO: 0.07 THOUSANDS/ÂΜL (ref 0–0.1)
BASOPHILS NFR BLD AUTO: 1 % (ref 0–1)
BENZODIAZ UR QL: NEGATIVE
BILIRUB SERPL-MCNC: 0.37 MG/DL (ref 0.2–1)
BILIRUB UR QL STRIP: NEGATIVE
BUN SERPL-MCNC: 6 MG/DL (ref 5–25)
CALCIUM SERPL-MCNC: 9.3 MG/DL (ref 8.4–10.2)
CARDIAC TROPONIN I PNL SERPL HS: 3 NG/L
CARDIAC TROPONIN I PNL SERPL HS: 6 NG/L
CHLORIDE SERPL-SCNC: 105 MMOL/L (ref 96–108)
CLARITY UR: CLEAR
CO2 SERPL-SCNC: 28 MMOL/L (ref 21–32)
COCAINE UR QL: NEGATIVE
COLOR UR: NORMAL
CREAT SERPL-MCNC: 0.88 MG/DL (ref 0.6–1.3)
EOSINOPHIL # BLD AUTO: 0.04 THOUSAND/ÂΜL (ref 0–0.61)
EOSINOPHIL NFR BLD AUTO: 1 % (ref 0–6)
ERYTHROCYTE [DISTWIDTH] IN BLOOD BY AUTOMATED COUNT: 13.4 % (ref 11.6–15.1)
ETHANOL SERPL-MCNC: <10 MG/DL
EXT PREGNANCY TEST URINE: NEGATIVE
EXT. CONTROL: NORMAL
FLUAV RNA RESP QL NAA+PROBE: NEGATIVE
FLUBV RNA RESP QL NAA+PROBE: NEGATIVE
GFR SERPL CREATININE-BSD FRML MDRD: 87 ML/MIN/1.73SQ M
GLUCOSE SERPL-MCNC: 101 MG/DL (ref 65–140)
GLUCOSE UR STRIP-MCNC: NEGATIVE MG/DL
HCT VFR BLD AUTO: 43.6 % (ref 34.8–46.1)
HGB BLD-MCNC: 14.2 G/DL (ref 11.5–15.4)
HGB UR QL STRIP.AUTO: NEGATIVE
IMM GRANULOCYTES # BLD AUTO: 0.02 THOUSAND/UL (ref 0–0.2)
IMM GRANULOCYTES NFR BLD AUTO: 0 % (ref 0–2)
INR PPP: 0.94 (ref 0.84–1.19)
KETONES UR STRIP-MCNC: NEGATIVE MG/DL
LEUKOCYTE ESTERASE UR QL STRIP: NEGATIVE
LYMPHOCYTES # BLD AUTO: 3.07 THOUSANDS/ÂΜL (ref 0.6–4.47)
LYMPHOCYTES NFR BLD AUTO: 37 % (ref 14–44)
MCH RBC QN AUTO: 31.8 PG (ref 26.8–34.3)
MCHC RBC AUTO-ENTMCNC: 32.6 G/DL (ref 31.4–37.4)
MCV RBC AUTO: 98 FL (ref 82–98)
METHADONE UR QL: NEGATIVE
MONOCYTES # BLD AUTO: 0.95 THOUSAND/ÂΜL (ref 0.17–1.22)
MONOCYTES NFR BLD AUTO: 11 % (ref 4–12)
NEUTROPHILS # BLD AUTO: 4.16 THOUSANDS/ÂΜL (ref 1.85–7.62)
NEUTS SEG NFR BLD AUTO: 50 % (ref 43–75)
NITRITE UR QL STRIP: NEGATIVE
NRBC BLD AUTO-RTO: 0 /100 WBCS
OPIATES UR QL SCN: NEGATIVE
OXYCODONE+OXYMORPHONE UR QL SCN: NEGATIVE
PCP UR QL: NEGATIVE
PH UR STRIP.AUTO: 7 [PH]
PLATELET # BLD AUTO: 237 THOUSANDS/UL (ref 149–390)
PMV BLD AUTO: 10.2 FL (ref 8.9–12.7)
POTASSIUM SERPL-SCNC: 5 MMOL/L (ref 3.5–5.3)
PROT SERPL-MCNC: 6.8 G/DL (ref 6.4–8.4)
PROT UR STRIP-MCNC: NEGATIVE MG/DL
PROTHROMBIN TIME: 12.7 SECONDS (ref 11.6–14.5)
RBC # BLD AUTO: 4.46 MILLION/UL (ref 3.81–5.12)
RSV RNA RESP QL NAA+PROBE: NEGATIVE
SALICYLATES SERPL-MCNC: <5 MG/DL (ref 3–20)
SARS-COV-2 RNA RESP QL NAA+PROBE: NEGATIVE
SODIUM SERPL-SCNC: 139 MMOL/L (ref 135–147)
SP GR UR STRIP.AUTO: 1.01 (ref 1–1.03)
THC UR QL: NEGATIVE
TSH SERPL DL<=0.05 MIU/L-ACNC: 2.18 UIU/ML (ref 0.45–4.5)
UROBILINOGEN UR QL STRIP.AUTO: 0.2 E.U./DL
WBC # BLD AUTO: 8.31 THOUSAND/UL (ref 4.31–10.16)

## 2023-02-28 RX ORDER — IBUPROFEN 600 MG/1
600 TABLET ORAL ONCE
Status: COMPLETED | OUTPATIENT
Start: 2023-02-28 | End: 2023-02-28

## 2023-02-28 RX ORDER — FOLIC ACID 1 MG/1
1 TABLET ORAL DAILY
Status: DISCONTINUED | OUTPATIENT
Start: 2023-03-01 | End: 2023-03-03 | Stop reason: HOSPADM

## 2023-02-28 RX ORDER — POLYETHYLENE GLYCOL 3350 17 G/17G
17 POWDER, FOR SOLUTION ORAL ONCE
Status: COMPLETED | OUTPATIENT
Start: 2023-02-28 | End: 2023-02-28

## 2023-02-28 RX ORDER — CHLORDIAZEPOXIDE HYDROCHLORIDE 25 MG/1
25 CAPSULE, GELATIN COATED ORAL EVERY 8 HOURS SCHEDULED
Status: COMPLETED | OUTPATIENT
Start: 2023-03-01 | End: 2023-03-02

## 2023-02-28 RX ORDER — LANOLIN ALCOHOL/MO/W.PET/CERES
6 CREAM (GRAM) TOPICAL
Status: DISCONTINUED | OUTPATIENT
Start: 2023-02-28 | End: 2023-03-03 | Stop reason: HOSPADM

## 2023-02-28 RX ORDER — LANOLIN ALCOHOL/MO/W.PET/CERES
100 CREAM (GRAM) TOPICAL DAILY
Status: DISCONTINUED | OUTPATIENT
Start: 2023-03-01 | End: 2023-03-03 | Stop reason: HOSPADM

## 2023-02-28 RX ORDER — DIPHENHYDRAMINE HCL 25 MG
12.5 TABLET ORAL EVERY 8 HOURS PRN
Status: DISCONTINUED | OUTPATIENT
Start: 2023-02-28 | End: 2023-03-03 | Stop reason: HOSPADM

## 2023-02-28 RX ORDER — CHLORDIAZEPOXIDE HYDROCHLORIDE 25 MG/1
50 CAPSULE, GELATIN COATED ORAL EVERY 8 HOURS SCHEDULED
Status: COMPLETED | OUTPATIENT
Start: 2023-02-28 | End: 2023-03-01

## 2023-02-28 RX ADMIN — CHLORDIAZEPOXIDE HYDROCHLORIDE 50 MG: 25 CAPSULE ORAL at 17:35

## 2023-02-28 RX ADMIN — MELATONIN TAB 3 MG 6 MG: 3 TAB at 21:33

## 2023-02-28 RX ADMIN — POLYETHYLENE GLYCOL 3350 17 G: 17 POWDER, FOR SOLUTION ORAL at 21:33

## 2023-02-28 RX ADMIN — SODIUM CHLORIDE 1000 ML: 0.9 INJECTION, SOLUTION INTRAVENOUS at 14:42

## 2023-02-28 RX ADMIN — IBUPROFEN 600 MG: 600 TABLET, FILM COATED ORAL at 21:33

## 2023-02-28 RX ADMIN — CHLORDIAZEPOXIDE HYDROCHLORIDE 50 MG: 25 CAPSULE ORAL at 21:32

## 2023-02-28 RX ADMIN — IBUPROFEN 600 MG: 600 TABLET, FILM COATED ORAL at 15:52

## 2023-02-28 NOTE — ASSESSMENT & PLAN NOTE
Likely secondary to alcohol withdrawal versus family history of epilepsy, historical alcohol abuse with daily use    · Seizure at work, witnessed  · CT head-No acute intracranial abnormality  · Consider MRI  · EtOH level<10  · CIWA in place, Librium protocol, MVI  · Neurology consulted  · Psychiatry consulted

## 2023-02-28 NOTE — ED PROVIDER NOTES
History  Chief Complaint   Patient presents with   • Seizure - New Onset     Pt had a witnessed seizure by a co worker, pt drinks vodka on a nightly basis     20-year-old female presents by EMS after having a witnessed seizure by coworkers  Patient states she has never had this before however strong history with her dad being epileptic in her brother recently having started seizures also  Patient also states she drinks alcohol every night until she falls asleep  No other complaints      History provided by:  Patient and EMS personnel   used: No        Prior to Admission Medications   Prescriptions Last Dose Informant Patient Reported? Taking?   escitalopram (Lexapro) 10 mg tablet   No No   Sig: Take 1 tablet (10 mg total) by mouth daily   hydrOXYzine pamoate (VISTARIL) 50 mg capsule   No No   Sig: Take 1 capsule (50 mg total) by mouth daily at bedtime      Facility-Administered Medications: None       No past medical history on file  No past surgical history on file  No family history on file  I have reviewed and agree with the history as documented  E-Cigarette/Vaping   • E-Cigarette Use Current Every Day User    • Start Date 22    • Cartridges/Day 1    • Comments 50mg      E-Cigarette/Vaping Substances   • Nicotine Yes      Social History     Tobacco Use   • Smoking status: Former     Types: Cigarettes     Quit date:      Years since quittin 1   • Smokeless tobacco: Never   Vaping Use   • Vaping Use: Every day   • Start date: 2022   • Substances: Nicotine   Substance Use Topics   • Alcohol use: Not Currently     Comment: stop 2 weeks ago   • Drug use: Yes     Types: Other     Comment: edibles       Review of Systems   Constitutional: Negative for activity change, chills, diaphoresis and fever  HENT: Negative for congestion, ear pain, nosebleeds, sore throat, trouble swallowing and voice change  Eyes: Negative for pain, discharge and redness     Respiratory: Negative for apnea, cough, choking, shortness of breath, wheezing and stridor  Cardiovascular: Negative for chest pain and palpitations  Gastrointestinal: Negative for abdominal distention, abdominal pain, constipation, diarrhea, nausea and vomiting  Endocrine: Negative for polydipsia  Genitourinary: Negative for difficulty urinating, dysuria, flank pain, frequency, hematuria and urgency  Musculoskeletal: Negative for back pain, gait problem, joint swelling, myalgias, neck pain and neck stiffness  Skin: Negative for pallor and rash  Neurological: Positive for seizures  Negative for dizziness, tremors, syncope, speech difficulty, weakness, numbness and headaches  Hematological: Negative for adenopathy  Psychiatric/Behavioral: Negative for confusion, hallucinations, self-injury and suicidal ideas  The patient is not nervous/anxious  Physical Exam  Physical Exam  Vitals and nursing note reviewed  Constitutional:       General: She is not in acute distress  Appearance: She is well-developed  She is not diaphoretic  HENT:      Head: Normocephalic and atraumatic  Right Ear: External ear normal       Left Ear: External ear normal       Nose: Nose normal    Eyes:      Conjunctiva/sclera: Conjunctivae normal       Pupils: Pupils are equal, round, and reactive to light  Cardiovascular:      Rate and Rhythm: Normal rate and regular rhythm  Heart sounds: Normal heart sounds  Pulmonary:      Effort: Pulmonary effort is normal       Breath sounds: Normal breath sounds  Abdominal:      General: Bowel sounds are normal       Palpations: Abdomen is soft  Musculoskeletal:         General: Normal range of motion  Cervical back: Normal range of motion and neck supple  Skin:     General: Skin is warm and dry  Neurological:      Mental Status: She is alert and oriented to person, place, and time  Deep Tendon Reflexes: Reflexes are normal and symmetric     Psychiatric: Behavior: Behavior is cooperative  Vital Signs  ED Triage Vitals   Temperature Pulse Respirations Blood Pressure SpO2   02/28/23 1554 02/28/23 1420 02/28/23 1420 02/28/23 1420 02/28/23 1420   98 2 °F (36 8 °C) 86 18 120/77 95 %      Temp Source Heart Rate Source Patient Position - Orthostatic VS BP Location FiO2 (%)   02/28/23 1554 02/28/23 1420 02/28/23 1420 02/28/23 1420 --   Temporal Monitor Sitting Right arm       Pain Score       02/28/23 1420       5           Vitals:    02/28/23 1420 02/28/23 1554   BP: 120/77 121/79   Pulse: 86 59   Patient Position - Orthostatic VS: Sitting Sitting         Visual Acuity      ED Medications  Medications   sodium chloride 0 9 % bolus 1,000 mL (1,000 mL Intravenous New Bag 2/28/23 1442)   ibuprofen (MOTRIN) tablet 600 mg (600 mg Oral Given 2/28/23 1552)       Diagnostic Studies  Results Reviewed     Procedure Component Value Units Date/Time    HS Troponin I 4hr [[id number]]     Lab Status: No result Specimen: Blood     TSH [[id number]]  (Normal) Collected: 02/28/23 1440    Lab Status: Final result Specimen: Blood from Arm, Left Updated: 02/28/23 1554     TSH 3RD GENERATON 2 177 uIU/mL     Narrative:      Patients undergoing fluorescein dye angiography may retain small amounts of fluorescein in the body for 48-72 hours post procedure  Samples containing fluorescein can produce falsely depressed TSH values  If the patient had this procedure,a specimen should be resubmitted post fluorescein clearance  Rapid drug screen, urine [[id number]]  (Normal) Collected: 02/28/23 1505    Lab Status: Final result Specimen: Urine, Clean Catch Updated: 02/28/23 1545     Amph/Meth UR Negative     Barbiturate Ur Negative     Benzodiazepine Urine Negative     Cocaine Urine Negative     Methadone Urine Negative     Opiate Urine Negative     PCP Ur Negative     THC Urine Negative     Oxycodone Urine Negative    Narrative:      FOR MEDICAL PURPOSES ONLY     IF CONFIRMATION NEEDED PLEASE CONTACT THE LAB WITHIN 5 DAYS  Drug Screen Cutoff Levels:  AMPHETAMINE/METHAMPHETAMINES  1000 ng/mL  BARBITURATES     200 ng/mL  BENZODIAZEPINES     200 ng/mL  COCAINE      300 ng/mL  METHADONE      300 ng/mL  OPIATES      300 ng/mL  PHENCYCLIDINE     25 ng/mL  THC       50 ng/mL  OXYCODONE      100 ng/mL    FLU/RSV/COVID - if FLU/RSV clinically relevant [[id number]]  (Normal) Collected: 02/28/23 1440    Lab Status: Final result Specimen: Nares from Nose Updated: 02/28/23 1536     SARS-CoV-2 Negative     INFLUENZA A PCR Negative     INFLUENZA B PCR Negative     RSV PCR Negative    Narrative:      FOR PEDIATRIC PATIENTS - copy/paste COVID Guidelines URL to browser: [email or web address]  [employer]    SARS-CoV-2 assay is a Nucleic Acid Amplification assay intended for the  qualitative detection of nucleic acid from SARS-CoV-2 in nasopharyngeal  swabs  Results are for the presumptive identification of SARS-CoV-2 RNA  Positive results are indicative of infection with SARS-CoV-2, the virus  causing COVID-19, but do not rule out bacterial infection or co-infection  with other viruses  Laboratories within the [place] and its  territories are required to report all positive results to the appropriate  public health authorities  Negative results do not preclude SARS-CoV-2  infection and should not be used as the sole basis for treatment or other  patient management decisions  Negative results must be combined with  clinical observations, patient history, and epidemiological information  This test has not been FDA cleared or approved  This test has been authorized by FDA under an Emergency Use Authorization  (EUA)   This test is only authorized for the duration of time the  declaration that circumstances exist justifying the authorization of the  emergency use of an in vitro diagnostic tests for detection of SARS-CoV-2  virus and/or diagnosis of COVID-19 infection under section 564(b)(1) of  the Act, 21 U  S C  163KRX-4(Q)(5), unless the authorization is terminated  or revoked sooner  The test has been validated but independent review by FDA  and CLIA is pending  Test performed using [employer]: This RT-PCR assay targets N2,  a region unique to SARS-CoV-2  A conserved region in the E-gene was chosen  for pan-Sarbecovirus detection which includes SARS-CoV-2  According to CMS-2020-01-R, this platform meets the definition of high-throughput technology      HS Troponin 0hr (reflex protocol) [[id number]]  (Normal) Collected: 02/28/23 1440    Lab Status: Final result Specimen: Blood from Arm, Left Updated: 02/28/23 1526     hs TnI 0hr 3 ng/L     HS Troponin I 2hr [[id number]]     Lab Status: No result Specimen: Blood     UA (URINE) with reflex to Scope [[id number]] Collected: 02/28/23 1505    Lab Status: Final result Specimen: Urine, Clean Catch Updated: 02/28/23 1525     Color, UA Light Yellow     Clarity, UA Clear     Specific Gravity, UA 1 010     pH, UA 7 0     Leukocytes, UA Negative     Nitrite, UA Negative     Protein, UA Negative mg/dl      Glucose, UA Negative mg/dl      Ketones, UA Negative mg/dl      Urobilinogen, UA 0 2 E U /dl      Bilirubin, UA Negative     Occult Blood, UA Negative    Ethanol [[id number]]  (Normal) Collected: 02/28/23 1440    Lab Status: Final result Specimen: Blood from Arm, Left Updated: 02/28/23 1517     Ethanol Lvl <10 mg/dL     Comprehensive metabolic panel [[id number]]  (Abnormal) Collected: 02/28/23 1440    Lab Status: Final result Specimen: Blood from Arm, Left Updated: 02/28/23 1516     Sodium 139 mmol/L      Potassium 5 0 mmol/L      Chloride 105 mmol/L      CO2 28 mmol/L      ANION GAP 6 mmol/L      BUN 6 mg/dL      Creatinine 0 88 mg/dL      Glucose 101 mg/dL      Calcium 9 3 mg/dL      AST 41 U/L      ALT 43 U/L      Alkaline Phosphatase 42 U/L      Total Protein 6 8 g/dL      Albumin 4 2 g/dL      Total Bilirubin 0 37 mg/dL      eGFR 87 ml/min/1 73sq m     Narrative:      [place] guidelines for Chronic Kidney Disease (CKD):   •  Stage 1 with normal or high GFR (GFR > 90 mL/min/1 73 square meters)  •  Stage 2 Mild CKD (GFR = 60-89 mL/min/1 73 square meters)  •  Stage 3A Moderate CKD (GFR = 45-59 mL/min/1 73 square meters)  •  Stage 3B Moderate CKD (GFR = 30-44 mL/min/1 73 square meters)  •  Stage 4 Severe CKD (GFR = 15-29 mL/min/1 73 square meters)  •  Stage 5 End Stage CKD (GFR <15 mL/min/1 73 square meters)  Note: GFR calculation is accurate only with a steady state creatinine    Salicylate level [[id number]]  (Normal) Collected: 02/28/23 1440    Lab Status: Final result Specimen: Blood from Arm, Left Updated: 87/65/52 5512     Salicylate Lvl <5 mg/dL     Acetaminophen level-If concentration is detectable, please discuss with medical  on call   [[id number]]  (Abnormal) Collected: 02/28/23 1440    Lab Status: Final result Specimen: Blood from Arm, Left Updated: 02/28/23 1516     Acetaminophen Level <10 ug/mL     Protime-INR [[id number]]  (Normal) Collected: 02/28/23 1440    Lab Status: Final result Specimen: Blood from Arm, Left Updated: 02/28/23 1512     Protime 12 7 seconds      INR 0 94    APTT [[id number]]  (Normal) Collected: 02/28/23 1440    Lab Status: Final result Specimen: Blood from Arm, Left Updated: 02/28/23 1512     PTT 27 seconds     POCT pregnancy, urine [[id number]]  (Normal) Resulted: 02/28/23 1505    Lab Status: Final result Updated: 02/28/23 1505     EXT Preg Test, Ur Negative     Control Valid    CBC and differential [[id number]] Collected: 02/28/23 1440    Lab Status: Final result Specimen: Blood from Arm, Left Updated: 02/28/23 1454     WBC 8 31 Thousand/uL      RBC 4 46 Million/uL      Hemoglobin 14 2 g/dL      Hematocrit 43 6 %      MCV 98 fL      MCH 31 8 pg      MCHC 32 6 g/dL      RDW 13 4 %      MPV 10 2 fL      Platelets 077 Thousands/uL      nRBC 0 /100 WBCs      Neutrophils Relative 50 %      Immat GRANS % 0 %      Lymphocytes Relative 37 %      Monocytes Relative 11 %      Eosinophils Relative 1 %      Basophils Relative 1 %      Neutrophils Absolute 4 16 Thousands/µL      Immature Grans Absolute 0 02 Thousand/uL      Lymphocytes Absolute 3 07 Thousands/µL      Monocytes Absolute 0 95 Thousand/µL      Eosinophils Absolute 0 04 Thousand/µL      Basophils Absolute 0 07 Thousands/µL                  CT head without contrast   Final Result by Albina Sagastume MD (02/28 0272)      No acute intracranial abnormality  Workstation performed: [id number]                    Procedures  Procedures         ED Course                                             Medical Decision Making  58-year-old female with new onset history of seizures and extensive alcohol use  Pertinent family history for epilepsy/seizures -we will check labs x-ray IV fluids admission    New onset seizure [facility]): acute illness or injury  Amount and/or Complexity of Data Reviewed  External Data Reviewed: radiology  Details: Reviewed x-ray myself no active disease  Labs: ordered  Radiology: ordered  Discussion of management or test interpretation with external provider(s): Discussed with hospitalist for admission to the hospital    Risk  Prescription drug management  Decision regarding hospitalization  Disposition  Final diagnoses:   New onset seizure [facility])     Time reflects when diagnosis was documented in both MDM as applicable and the Disposition within this note     Time User Action Codes Description Comment    2/28/2023  4:02 PM Steffanie Cr Add [R56 9] New onset seizure [facility])       ED Disposition     ED Disposition   Admit    Condition   Stable    Date/Time   Tue Feb 28, 2023  4:02 PM    Comment   Case was discussed withDr Pj Schultz  and the patient's admission status was agreed to be Admission Status: observation status to the service of Dr Francisco Quezada              Follow-up Information    None Patient's Medications   Discharge Prescriptions    No medications on file       No discharge procedures on file      PDMP Review     None          ED Provider  Electronically Signed by           Landen Reyes DO  02/28/23 0312

## 2023-02-28 NOTE — H&P
Arturo Joy 1990, 33 y o  female MRN: [id number]  Unit/Bed#: [address] [place] [address] Encounter: [id number]  Primary Care Provider: Esteban Marinelli MD   Date and time admitted to hospital: 2/28/2023  2:20 PM    * Seizure [facility])  Assessment & Plan  Likely secondary to alcohol withdrawal versus family history of epilepsy, historical alcohol abuse with daily use    · Seizure at work, witnessed  · CT head-No acute intracranial abnormality  · Consider MRI  · EtOH level<10  · CIWA in place, Librium protocol, MVI  · Neurology consulted  · Psychiatry consulted    Alcohol abuse  Assessment & Plan  Chronic,    The patient states she drinks  per week  · CIWA protocol in place, Librium taper  · Cessation counseling  · Psych consulted for evaluation and treatment  · Initiate Antabuse on discharge      Generalized anxiety disorder  Assessment & Plan  Chronic,    · Continue home Vistaril 50 mg and Lexapro 10 mg  · Appreciate psych recs    Insomnia  Assessment & Plan  · Melatonin 6 mg  · As needed Benadryl  · Consider trazodone if candidate, use with caution with alcohol dependence    Illicit drug use  Assessment & Plan  Historical diagnosis    · UDS negative    Smoker  Assessment & Plan  Nicotine patch 14 mg    VTE Pharmacologic Prophylaxis:   Moderate Risk (Score 3-4) - Pharmacological DVT Prophylaxis Ordered: enoxaparin (Lovenox)  Code Status: No Order Full  Discussion with family: Patient declined call to   Anticipated Length of Stay: Patient will be admitted on an inpatient basis with an anticipated length of stay of greater than 2 midnights secondary to Clinical course      Total Time Spent on Date of Encounter in care of patient: 55 minutes This time was spent on one or more of the following: performing physical exam; counseling and coordination of care; obtaining or reviewing history; documenting in the medical record; reviewing/ordering tests, medications or procedures; communicating with other healthcare professionals and discussing with patient's family/caregivers  Chief Complaint: Seizure    History of Present Illness:  Rona Gong is a 33 y o  female with a PMH of insomnia, alcohol dependence who presents with new onset seizures  Patient reported that she has never had a seizures, patient reported that she had a drink this am because she had the shakes  Patient reported that she drinks vodka daily (unknown amount, at most a pint )and that this am she drank 2 fireball shots  Patient stated that she has been drinking like this for last 5 years  Patient stated that she has insomnia and the main reason why she drinks  Patient stated that alcohol has not been working  Patient was at work when had seizure and sitting in chair and fell to floor for a minute and woke up in ambulance  Patient has no recollection or pain at this time  Patient stated that her father is epiletic at 11 years, no family history of MS or any known neuro diseases  Review of Systems:  Review of Systems   Constitutional: Negative for chills and fever  HENT: Negative for ear pain and sore throat  Eyes: Negative for pain and visual disturbance  Respiratory: Negative for cough and shortness of breath  Cardiovascular: Negative for chest pain, palpitations and leg swelling  Gastrointestinal: Negative for abdominal pain and vomiting  Genitourinary: Negative for dysuria and hematuria  Musculoskeletal: Negative for arthralgias, back pain and gait problem  Skin: Negative for color change and rash  Neurological: Positive for weakness and headaches  Negative for seizures and syncope  Psychiatric/Behavioral: The patient is nervous/anxious  All other systems reviewed and are negative  Past Medical and Surgical History:   No past medical history on file  No past surgical history on file      Meds/Allergies:  Prior to Admission medications    Medication Sig Start Date End Date Taking? 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   Patient Pre-hospital Living Situation: Home  Patient Pre-hospital Level of Mobility: walks  Patient Pre-hospital Diet Restrictions:   Substance Use History:   Social History     Substance and Sexual Activity   Alcohol Use Not Currently    Comment: stop 2 weeks ago     Social History     Tobacco Use   Smoking Status Former   • Types: Cigarettes   • Quit date:    • Years since quittin 1   Smokeless Tobacco Never     Social History     Substance and Sexual Activity   Drug Use Yes   • Types: Other, Marijuana    Comment: edibles       Family History:  No family history on file  Physical Exam:     Vitals:   Blood Pressure: 121/83 (23 181)  Pulse: 75 (23 181)  Temperature: 99 1 °F (37 3 °C) (23 181)  Temp Source: Temporal (23 1554)  Respirations: 18 (23 1745)  Height: 5' 4" (162 6 cm) (23 1420)  Weight - Scale: 63 5 kg (140 lb) (23 1420)  SpO2: 96 % (23)    Physical Exam  Vitals and nursing note reviewed  Constitutional:       General: She is not in acute distress  Appearance: She is well-developed  HENT:      Head: Normocephalic and atraumatic  Eyes:      Conjunctiva/sclera: Conjunctivae normal    Cardiovascular:      Rate and Rhythm: Normal rate and regular rhythm  Heart sounds: No murmur heard  No friction rub  No gallop  Pulmonary:      Effort: Pulmonary effort is normal  No respiratory distress  Breath sounds: Normal breath sounds  No stridor   No wheezing, rhonchi or rales    Abdominal:      Palpations: Abdomen is soft  Tenderness: There is no abdominal tenderness  There is no guarding or rebound  Musculoskeletal:         General: No swelling or tenderness  Cervical back: Neck supple  Right lower leg: No edema  Left lower leg: No edema  Skin:     General: Skin is warm and dry  Capillary Refill: Capillary refill takes less than 2 seconds  Findings: No bruising  Neurological:      Mental Status: She is alert and oriented to person, place, and time  Motor: No weakness  Psychiatric:         Mood and Affect: Mood normal          Behavior: Behavior normal           Additional Data:     Lab Results:  Results from last 7 days   Lab Units 02/28/23  1440   WBC Thousand/uL 8 31   HEMOGLOBIN g/dL 14 2   HEMATOCRIT % 43 6   PLATELETS Thousands/uL 237   NEUTROS PCT % 50   LYMPHS PCT % 37   MONOS PCT % 11   EOS PCT % 1     Results from last 7 days   Lab Units 02/28/23  1440   SODIUM mmol/L 139   POTASSIUM mmol/L 5 0   CHLORIDE mmol/L 105   CO2 mmol/L 28   BUN mg/dL 6   CREATININE mg/dL 0 88   ANION GAP mmol/L 6   CALCIUM mg/dL 9 3   ALBUMIN g/dL 4 2   TOTAL BILIRUBIN mg/dL 0 37   ALK PHOS U/L 42   ALT U/L 43   AST U/L 41*   GLUCOSE RANDOM mg/dL 101     Results from last 7 days   Lab Units 02/28/23  1440   INR  0 94                   Lines/Drains:  Invasive Devices     Peripheral Intravenous Line  Duration           Peripheral IV 02/28/23 Left Antecubital <1 day                     Imaging: Reviewed radiology reports from this admission including: CT head  CT head without contrast   Final Result by Rebekah Velasquez MD (02/28 1538)      No acute intracranial abnormality  Workstation performed: [id number]             EKG and Other Studies Reviewed on Admission:   EKG: No EKG obtained  pending  ** Please Note: This note has been constructed using a voice recognition system   **

## 2023-02-28 NOTE — LETTER
[address] [facility] [address]  [address]  [place] [address]  Dept: [phone]    March 3, 2023     Patient: Liza Roche   YOB: 1990   Date of Visit: 2/28/2023       To Whom it May Concern:    Gera Jackson is under my professional care  She was seen in the hospital from 2/28/2023 to 03/03/23  Upon discharge patient will need additional medical therapy and She may return to work on March 29, 2023 without limitations  If you have any questions or concerns, please don't hesitate to call           Sincerely,          Ady Hernandez MD

## 2023-02-28 NOTE — PLAN OF CARE
Problem: NEUROSENSORY - ADULT  Goal: Achieves stable or improved neurological status  Description: INTERVENTIONS  - Monitor and report changes in neurological status  - Monitor vital signs such as temperature, blood pressure, glucose, and any other labs ordered   - Initiate measures to prevent increased intracranial pressure  - Monitor for seizure activity and implement precautions if appropriate      Outcome: Progressing  Goal: Remains free of injury related to seizures activity  Description: INTERVENTIONS  - Maintain airway, patient safety  and administer oxygen as ordered  - Monitor patient for seizure activity, document and report duration and description of seizure to physician/advanced practitioner  - If seizure occurs,  ensure patient safety during seizure  - Reorient patient post seizure  - Seizure pads on all 4 side rails  - Instruct patient/family to notify RN of any seizure activity including if an aura is experienced  - Instruct patient/family to call for assistance with activity based on nursing assessment  - Administer anti-seizure medications if ordered    Outcome: Progressing  Goal: Achieves maximal functionality and self care  Description: INTERVENTIONS  - Monitor swallowing and airway patency with patient fatigue and changes in neurological status  - Encourage and assist patient to increase activity and self care     - Encourage visually impaired, hearing impaired and aphasic patients to use assistive/communication devices  Outcome: Progressing     Problem: PAIN - ADULT  Goal: Verbalizes/displays adequate comfort level or baseline comfort level  Description: Interventions:  - Encourage patient to monitor pain and request assistance  - Assess pain using appropriate pain scale  - Administer analgesics based on type and severity of pain and evaluate response  - Implement non-pharmacological measures as appropriate and evaluate response  - Consider cultural and social influences on pain and pain management  - Notify physician/advanced practitioner if interventions unsuccessful or patient reports new pain  Outcome: Progressing     Problem: INFECTION - ADULT  Goal: Absence or prevention of progression during hospitalization  Description: INTERVENTIONS:  - Assess and monitor for signs and symptoms of infection  - Monitor lab/diagnostic results  - Monitor all insertion sites, i e  indwelling lines, tubes, and drains  - Monitor endotracheal if appropriate and nasal secretions for changes in amount and color  - [place] appropriate cooling/warming therapies per order  - Administer medications as ordered  - Instruct and encourage patient and family to use good hand hygiene technique  - Identify and instruct in appropriate isolation precautions for identified infection/condition  Outcome: Progressing  Goal: Absence of fever/infection during neutropenic period  Description: INTERVENTIONS:  - Monitor WBC    Outcome: Progressing     Problem: SAFETY ADULT  Goal: Patient will remain free of falls  Description: INTERVENTIONS:  - Educate patient/family on patient safety including physical limitations  - Instruct patient to call for assistance with activity   - Consult OT/PT to assist with strengthening/mobility   - Keep Call bell within reach  - Keep bed low and locked with side rails adjusted as appropriate  - Keep care items and personal belongings within reach  - Initiate and maintain comfort rounds  - Make Fall Risk Sign visible to staff  - Offer Toileting every 3 Hours, in advance of need  - Initiate/Maintain bed alarm  - Apply yellow socks and bracelet for high fall risk patients  - Consider moving patient to room near nurses station  Outcome: Progressing  Goal: Maintain or return to baseline ADL function  Description: INTERVENTIONS:  -  Assess patient's ability to carry out ADLs; assess patient's baseline for ADL function and identify physical deficits which impact ability to perform ADLs (bathing, care of mouth/teeth, toileting, grooming, dressing, etc )  - Assess/evaluate cause of self-care deficits   - Assess range of motion  - Assess patient's mobility; develop plan if impaired  - Assess patient's need for assistive devices and provide as appropriate  - Encourage maximum independence but intervene and supervise when necessary  - Involve family in performance of ADLs  - Assess for home care needs following discharge   - Consider OT consult to assist with ADL evaluation and planning for discharge  - Provide patient education as appropriate  Outcome: Progressing  Goal: Maintains/Returns to pre admission functional level  Description: INTERVENTIONS:  - Perform BMAT or MOVE assessment daily    - Set and communicate daily mobility goal to care team and patient/family/caregiver  - Collaborate with rehabilitation services on mobility goals if consulted  - Perform Range of Motion 3 times a day  - Reposition patient every 3 hours  - Dangle patient 3 times a day  - Stand patient 3 times a day  - Ambulate patient 3 times a day  - Out of bed to chair 3 times a day   - Out of bed for meals 3 times a day  - Out of bed for toileting  - Record patient progress and toleration of activity level   Outcome: Progressing     Problem: Knowledge Deficit  Goal: Patient/family/caregiver demonstrates understanding of disease process, treatment plan, medications, and discharge instructions  Description: Complete learning assessment and assess knowledge base    Interventions:  - Provide teaching at level of understanding  - Provide teaching via preferred learning methods  Outcome: Progressing

## 2023-02-28 NOTE — ASSESSMENT & PLAN NOTE
Nicotine patch 14 mg Subjective   The patient is a 39-year-old male with past medical history of asthma, COVID-19, gout, hyperlipidemia, hypertension who presents emergency department with chief complaint of neck pain.  Pain is to right side of neck, upper shoulder.  Started 3 days ago upon waking.  Was concerned that he could have slept on it funny.  Mother told him that he was sleeping in an odd position. Does do lifting at work, but no traumatic injury. Denies numbness/tingling. Denies URI symptoms, fever, chills. Worse with neck movement. OTC medications without relief.       History provided by:  Patient   used: No    Neck Pain  Pain location:  R side  Quality:  Aching  Pain radiates to:  R shoulder  Pain severity:  Moderate  Pain is:  Unable to specify  Onset quality:  Gradual  Duration:  3 days  Timing:  Constant  Progression:  Unchanged  Chronicity:  New  Context: not fall, not lifting a heavy object and not recent injury    Relieved by:  Nothing  Exacerbated by: some movement.  Ineffective treatments: OTC medications.  Associated symptoms: no fever, no numbness, no paresis, no tingling and no weakness    Risk factors: no recurrent falls             Review of Systems   Constitutional: Negative for chills and fever.   HENT: Negative for congestion and sore throat.    Respiratory: Negative for cough and shortness of breath.    Gastrointestinal: Negative for diarrhea, nausea and vomiting.   Musculoskeletal: Positive for neck pain. Negative for back pain and neck stiffness.   Skin: Negative for wound.   Neurological: Negative for tingling, weakness and numbness.   All other systems reviewed and are negative.      Past Medical History:   Diagnosis Date   • Asthma    • COVID-19    • Gout    • Hyperlipidemia    • Hypertension        No Known Allergies    History reviewed. No pertinent surgical history.    History reviewed. No pertinent family history.    Social History     Socioeconomic History   • Marital status:  Single   Tobacco Use   • Smoking status: Current Every Day Smoker     Packs/day: 0.50     Types: Cigarettes   • Smokeless tobacco: Never Used   Vaping Use   • Vaping Use: Never used   Substance and Sexual Activity   • Alcohol use: Never   • Drug use: Never   • Sexual activity: Defer           Objective   Physical Exam  Vitals and nursing note reviewed.   Constitutional:       Appearance: Normal appearance. He is not ill-appearing or toxic-appearing.   HENT:      Head: Normocephalic.      Nose: Nose normal.      Mouth/Throat:      Mouth: Mucous membranes are moist.   Eyes:      Conjunctiva/sclera: Conjunctivae normal.   Cardiovascular:      Rate and Rhythm: Normal rate.      Comments: Radial pulse 2+  Pulmonary:      Effort: Pulmonary effort is normal.   Musculoskeletal:         General: Normal range of motion.      Cervical back: Normal range of motion. Tenderness (over right trapezius muscle) present. No edema, erythema, lacerations, bony tenderness or crepitus.      Thoracic back: Normal.      Lumbar back: Normal.      Comments: Full ROM right shoulder without pain.    Skin:     General: Skin is warm and dry.      Capillary Refill: Capillary refill takes less than 2 seconds.   Neurological:      Mental Status: He is alert.      Sensory: No sensory deficit.         Procedures           ED Course                                           MDM    39-year-old male presents ED with chief complaint of right-sided neck pain.  Started after sleeping in a funny position.  No traumatic injury.  No recent URI symptoms, fever, nausea, vomiting, diarrhea.  Pain is unilateral.  No midline tenderness.  No step-off or crepitus.  Tenderness to palpation over the right trapezius.  Do not suspect epidural abscess, acute fracture, meningitis.  No meningeal signs.  X-ray not indicated at this time.  Will prescribe a short course of muscle relaxants advise follow-up with primary care.  Discussed return precautions.    I have spoken with  the patient. I have explained the patient´s condition, diagnoses and treatment plan based on the information available to me at this time. I have answered the patient questions and addressed any concerns. The patient has a good  understanding of the patient´s diagnosis, condition, and treatment plan as can be expected at this point. The vital signs have been stable. The patient´s condition is stable and appropriate for discharge from the emergency department.      The patient will pursue further outpatient evaluation with the primary care physician or other designated or consulting physician as outlined in the discharge instructions. They are agreeable to this plan of care and follow-up instructions have been explained in detail. The patient has received these instructions in written format and have expressed an understanding of the discharge instructions. The patient is aware that any significant change in condition or worsening of symptoms should prompt an immediate return to this or the closest emergency department or call to [facility].     Final diagnoses:   Trapezius strain, right, initial encounter       ED Disposition  ED Disposition     ED Disposition   Discharge    Condition   Stable    Comment   --             Fabián Graham MD  [address] [address]  [phone]          [facility] EMERGENCY ROOM  [address] [facility] [address] [phone]    If symptoms worsen         Medication List      New Prescriptions    cyclobenzaprine 10 MG tablet  Commonly known as: FLEXERIL  Take 1 tablet by mouth 3 (Three) Times a Day As Needed for Muscle Spasms for up to 5 days.           Where to Get Your Medications      These medications were sent to [id number] - [place] - [address] AT [facility] OF [address] - [phone] [facility] [phone] FX  [address] [place] [address]    Phone: [phone]   · cyclobenzaprine 10  MG tablet                 Ag Garcia PA  08/01/22 4668

## 2023-02-28 NOTE — ASSESSMENT & PLAN NOTE
Chronic,    The patient states she drinks  per week      · CIWA protocol in place, Librium taper  · Cessation counseling  · Psych consulted for evaluation and treatment  · Initiate Antabuse on discharge

## 2023-03-01 ENCOUNTER — APPOINTMENT (OUTPATIENT)
Dept: RADIOLOGY | Facility: HOSPITAL | Age: 33
End: 2023-03-01

## 2023-03-01 PROBLEM — F19.90 ILLICIT DRUG USE: Status: RESOLVED | Noted: 2022-07-26 | Resolved: 2023-03-01

## 2023-03-01 LAB
ALBUMIN SERPL BCP-MCNC: 3.6 G/DL (ref 3.5–5)
ALP SERPL-CCNC: 36 U/L (ref 34–104)
ALT SERPL W P-5'-P-CCNC: 32 U/L (ref 7–52)
ANION GAP SERPL CALCULATED.3IONS-SCNC: 5 MMOL/L (ref 4–13)
AST SERPL W P-5'-P-CCNC: 31 U/L (ref 13–39)
BILIRUB SERPL-MCNC: 0.27 MG/DL (ref 0.2–1)
BUN SERPL-MCNC: 11 MG/DL (ref 5–25)
CALCIUM SERPL-MCNC: 8.6 MG/DL (ref 8.4–10.2)
CHLORIDE SERPL-SCNC: 109 MMOL/L (ref 96–108)
CO2 SERPL-SCNC: 27 MMOL/L (ref 21–32)
CREAT SERPL-MCNC: 0.84 MG/DL (ref 0.6–1.3)
GFR SERPL CREATININE-BSD FRML MDRD: 92 ML/MIN/1.73SQ M
GLUCOSE P FAST SERPL-MCNC: 85 MG/DL (ref 65–99)
GLUCOSE SERPL-MCNC: 85 MG/DL (ref 65–140)
MAGNESIUM SERPL-MCNC: 1.8 MG/DL (ref 1.9–2.7)
POTASSIUM SERPL-SCNC: 3.8 MMOL/L (ref 3.5–5.3)
PROT SERPL-MCNC: 5.9 G/DL (ref 6.4–8.4)
SODIUM SERPL-SCNC: 141 MMOL/L (ref 135–147)

## 2023-03-01 RX ORDER — ESCITALOPRAM OXALATE 10 MG/1
10 TABLET ORAL DAILY
Status: DISCONTINUED | OUTPATIENT
Start: 2023-03-01 | End: 2023-03-03 | Stop reason: HOSPADM

## 2023-03-01 RX ADMIN — Medication 1 TABLET: at 10:01

## 2023-03-01 RX ADMIN — THIAMINE HCL TAB 100 MG 100 MG: 100 TAB at 10:01

## 2023-03-01 RX ADMIN — MELATONIN TAB 3 MG 6 MG: 3 TAB at 21:10

## 2023-03-01 RX ADMIN — CHLORDIAZEPOXIDE HYDROCHLORIDE 25 MG: 25 CAPSULE ORAL at 21:10

## 2023-03-01 RX ADMIN — CHLORDIAZEPOXIDE HYDROCHLORIDE 25 MG: 25 CAPSULE ORAL at 13:58

## 2023-03-01 RX ADMIN — FOLIC ACID 1 MG: 1 TABLET ORAL at 10:01

## 2023-03-01 RX ADMIN — GADOBUTROL 6 ML: 604.72 INJECTION INTRAVENOUS at 15:37

## 2023-03-01 RX ADMIN — CHLORDIAZEPOXIDE HYDROCHLORIDE 50 MG: 25 CAPSULE ORAL at 06:01

## 2023-03-01 RX ADMIN — ESCITALOPRAM OXALATE 10 MG: 10 TABLET, FILM COATED ORAL at 11:54

## 2023-03-01 NOTE — ASSESSMENT & PLAN NOTE
Chronic,    The patient states she drinks  per week      · CIWA-1, protocol in place, Librium taper  · Cessation counseling completed  · Psych consulted:recs inpatient alcohol rehab, patient agreed, initiate Lexapro 10 mg daily, Continue Librium 25 mg 3 times daily  · Psychotherapy  · Initiate Antabuse on discharge

## 2023-03-01 NOTE — UTILIZATION REVIEW
Initial Clinical Review    Admission: Date/Time/Statement:   Admission Orders (From admission, onward)     Ordered        02/28/23 1603  Place in Observation  Once                      Orders Placed This Encounter   Procedures   • Place in Observation     Standing Status:   Standing     Number of Occurrences:   1     Order Specific Question:   Level of Care     Answer:   Med Surg [16]     ED Arrival Information     Expected   -    Arrival   2/28/2023 14:15    Acuity   Emergent            Means of arrival   Ambulance    Escorted by   [facility]    Admission type   Emergency            Arrival complaint   seizure           Chief Complaint   Patient presents with   • Seizure - New Onset     Pt had a witnessed seizure by a co worker, pt drinks vodka on a nightly basis       Initial Presentation: 33 y o  female , presented to the ED @ [address] from work via EMS  Admitted as Observation due to new onset Seizure  Date: 02/28/2023  Presents with new onset seizures  Patient reported that she has never had a seizures, patient reported that she had a drink this am because she had the shakes  Patient reported that she drinks vodka daily (unknown amount, at most a pint )and that this am she drank 2 fireball shots  Patient stated that she has been drinking like this for last 5 years  Patient stated that she has insomnia and the main reason why she drinks  Patient stated that alcohol has not been working  Patient was at work when had seizure and sitting in chair and fell to floor for a minute and woke up in ambulance  Patient has no recollection or pain at this time  Patient stated that her father is epiletic at 11 years, no family history of MS or any known neuro diseases  CT head-No acute intracranial abnormality  EtOH level<10  CIWA in place, Librium, MVI  Neurology consulted  Psychiatry consulted  Day 2:  03/01/2023   CIWA - 1  Librium  MVI  Antabuse on discharge        03/01/2023 Consult Behavioral Health: Major depression recurrent  Anxiety  Alcohol abuse dependent  Recommended inpatient alcohol rehab after the medical clearance patient agreed  Will start her Lexapro 10 mg daily  Continue Librium 25 mg 3 times daily  Psychotherapy  ED Triage Vitals   Temperature Pulse Respirations Blood Pressure SpO2   02/28/23 1554 02/28/23 1420 02/28/23 1420 02/28/23 1420 02/28/23 1420   98 2 °F (36 8 °C) 86 18 120/77 95 %      Temp Source Heart Rate Source Patient Position - Orthostatic VS BP Location FiO2 (%)   02/28/23 1554 02/28/23 1420 02/28/23 1420 02/28/23 1420 --   Temporal Monitor Sitting Right arm       Pain Score       02/28/23 1420       5          Wt Readings from Last 1 Encounters:   02/28/23 63 5 kg (140 lb)     Additional Vital Signs:   Date/Time Temp Pulse Resp BP MAP (mmHg) SpO2 O2 Device Patient Position - Orthostatic VS   03/01/23 08:15:02 98 5 °F (36 9 °C) 90 -- 107/69 82 96 % -- --   02/28/23 23:31:57 97 5 °F (36 4 °C) 62 20 93/53 66 97 % -- --   02/28/23 2000 -- 75 -- 121/83 -- -- -- --   02/28/23 18:19:22 99 1 °F (37 3 °C) 75 -- 121/83 96 96 % -- --   02/28/23 18:18:48 99 1 °F (37 3 °C) 63 -- 121/83 96 96 % -- --   02/28/23 1745 -- 72 18 111/65 83 98 % None (Room air) Lying   02/28/23 1743 -- 72 20 111/65 -- 98 % None (Room air) Sitting   02/28/23 1554 98 2 °F (36 8 °C) 59 20 121/79 -- 99 % None (Room air) Sitting       CIWA Score  2, 1, 1    Pertinent Labs/Diagnostic Test Results:   CT head without contrast   Final Result by Sharon Cash MD (02/28 1538)      No acute intracranial abnormality          Results from last 7 days   Lab Units 02/28/23  1440   SARS-COV-2  Negative     Results from last 7 days   Lab Units 02/28/23  1440   WBC Thousand/uL 8 31   HEMOGLOBIN g/dL 14 2   HEMATOCRIT % 43 6   PLATELETS Thousands/uL 237   NEUTROS ABS Thousands/µL 4 16     Results from last 7 days   Lab Units 03/01/23  0538 02/28/23  1440   SODIUM mmol/L 141 139   POTASSIUM mmol/L 3 8 5 0   CHLORIDE mmol/L 109* 105   CO2 mmol/L 27 28   ANION GAP mmol/L 5 6   BUN mg/dL 11 6   CREATININE mg/dL 0 84 0 88   EGFR ml/min/1 73sq m 92 87   CALCIUM mg/dL 8 6 9 3   MAGNESIUM mg/dL 1 8*  --      Results from last 7 days   Lab Units 03/01/23  0538 02/28/23  1440   AST U/L 31 41*   ALT U/L 32 43   ALK PHOS U/L 36 42   TOTAL PROTEIN g/dL 5 9* 6 8   ALBUMIN g/dL 3 6 4 2   TOTAL BILIRUBIN mg/dL 0 27 0 37     Results from last 7 days   Lab Units 03/01/23  0538 02/28/23  1440   GLUCOSE RANDOM mg/dL 85 101     Results from last 7 days   Lab Units 02/28/23  1748 02/28/23  1440   HS TNI 0HR ng/L  --  3   HS TNI 2HR ng/L 6  --    HSTNI D2 ng/L 3  --      Results from last 7 days   Lab Units 02/28/23  1440   PROTIME seconds 12 7   INR  0 94   PTT seconds 27     Results from last 7 days   Lab Units 02/28/23  1440   TSH 3RD GENERATON uIU/mL 2 177     Results from last 7 days   Lab Units 02/28/23  1505   CLARITY UA  Clear   COLOR UA  Light Yellow   SPEC GRAV UA  1 010   PH UA  7 0   GLUCOSE UA mg/dl Negative   KETONES UA mg/dl Negative   BLOOD UA  Negative   PROTEIN UA mg/dl Negative   NITRITE UA  Negative   BILIRUBIN UA  Negative   UROBILINOGEN UA E U /dl 0 2   LEUKOCYTES UA  Negative     Results from last 7 days   Lab Units 02/28/23  1440   INFLUENZA A PCR  Negative   INFLUENZA B PCR  Negative   RSV PCR  Negative     Results from last 7 days   Lab Units 02/28/23  1505   AMPH/METH  Negative   BARBITURATE UR  Negative   BENZODIAZEPINE UR  Negative   COCAINE UR  Negative   METHADONE URINE  Negative   OPIATE UR  Negative   PCP UR  Negative   THC UR  Negative     Results from last 7 days   Lab Units 02/28/23  1440   ETHANOL LVL mg/dL <10   ACETAMINOPHEN LVL ug/mL <49*   SALICYLATE LVL mg/dL <5     ED Treatment:   Medication Administration from 02/28/2023 1414 to 02/28/2023 1812       Date/Time Order Dose Route Action     02/28/2023 1442 EST sodium chloride 0 9 % bolus 1,000 mL 1,000 mL Intravenous New Bag 02/28/2023 1552 EST ibuprofen (MOTRIN) tablet 600 mg 600 mg Oral Given     02/28/2023 1735 EST chlordiazePOXIDE (LIBRIUM) capsule 50 mg 50 mg Oral Given        No past medical history on file  Present on Admission:  • Alcohol abuse  • Generalized anxiety disorder  • Illicit drug use  • Smoker  • Insomnia      Admitting Diagnosis: Alcohol abuse [F10 10]  Seizure ([facility] [facility] ) [R56 9]  New onset seizure ([facility] ) [R56 9]  Age/Sex: 33 y o  female  Admission Orders:  Regular  Telemetry  CIWA-Ar  Continuous pulse ox      Scheduled Medications:  chlordiazePOXIDE, 25 mg, Oral, T6F [address]  folic acid, 1 mg, Oral, Daily  melatonin, 6 mg, Oral, HS  multivitamin-minerals, 1 tablet, Oral, Daily  thiamine, 100 mg, Oral, Daily      Continuous IV Infusions:     PRN Meds:  diphenhydrAMINE, 12 5 mg, Oral, Q8H PRN        IP CONSULT TO PSYCHIATRY  IP CONSULT TO NEUROLOGY    Network Utilization Review Department  ATTENTION: Please call with any questions or concerns to [phone] and carefully listen to the prompts so that you are directed to the right person  All voicemails are confidential   Blanca Men all requests for admission clinical reviews, approved or denied determinations and any other requests to dedicated fax number below belonging to the campus where the patient is receiving treatment   List of dedicated fax numbers for the Facilities:  [address] DENIALS (Administrative/Medical Necessity) [phone]   [address] (Maternity/NICU/Pediatrics) [phone]   [address] Chanel Hood [phone]   [facility] [phone]   [address] [address] Medical [address] [place] [id number] [place] [address] [address] [phone]

## 2023-03-01 NOTE — ASSESSMENT & PLAN NOTE
· Melatonin 6 mg  · As needed Benadryl  · Consider trazodone if candidate, use with caution with alcohol dependence

## 2023-03-01 NOTE — PLAN OF CARE
Problem: NEUROSENSORY - ADULT  Goal: Achieves stable or improved neurological status  Description: INTERVENTIONS  - Monitor and report changes in neurological status  - Monitor vital signs such as temperature, blood pressure, glucose, and any other labs ordered   - Initiate measures to prevent increased intracranial pressure  - Monitor for seizure activity and implement precautions if appropriate      Outcome: Progressing  Goal: Remains free of injury related to seizures activity  Description: INTERVENTIONS  - Maintain airway, patient safety  and administer oxygen as ordered  - Monitor patient for seizure activity, document and report duration and description of seizure to physician/advanced practitioner  - If seizure occurs,  ensure patient safety during seizure  - Reorient patient post seizure  - Seizure pads on all 4 side rails  - Instruct patient/family to notify RN of any seizure activity including if an aura is experienced  - Instruct patient/family to call for assistance with activity based on nursing assessment  - Administer anti-seizure medications if ordered    Outcome: Progressing  Goal: Achieves maximal functionality and self care  Description: INTERVENTIONS  - Monitor swallowing and airway patency with patient fatigue and changes in neurological status  - Encourage and assist patient to increase activity and self care     - Encourage visually impaired, hearing impaired and aphasic patients to use assistive/communication devices  Outcome: Progressing     Problem: PAIN - ADULT  Goal: Verbalizes/displays adequate comfort level or baseline comfort level  Description: Interventions:  - Encourage patient to monitor pain and request assistance  - Assess pain using appropriate pain scale  - Administer analgesics based on type and severity of pain and evaluate response  - Implement non-pharmacological measures as appropriate and evaluate response  - Consider cultural and social influences on pain and pain management  - Notify physician/advanced practitioner if interventions unsuccessful or patient reports new pain  Outcome: Progressing     Problem: INFECTION - ADULT  Goal: Absence or prevention of progression during hospitalization  Description: INTERVENTIONS:  - Assess and monitor for signs and symptoms of infection  - Monitor lab/diagnostic results  - Monitor all insertion sites, i e  indwelling lines, tubes, and drains  - Monitor endotracheal if appropriate and nasal secretions for changes in amount and color  - [place] appropriate cooling/warming therapies per order  - Administer medications as ordered  - Instruct and encourage patient and family to use good hand hygiene technique  - Identify and instruct in appropriate isolation precautions for identified infection/condition  Outcome: Progressing  Goal: Absence of fever/infection during neutropenic period  Description: INTERVENTIONS:  - Monitor WBC    Outcome: Progressing     Problem: SAFETY ADULT  Goal: Patient will remain free of falls  Description: INTERVENTIONS:  - Educate patient/family on patient safety including physical limitations  - Instruct patient to call for assistance with activity   - Consult OT/PT to assist with strengthening/mobility   - Keep Call bell within reach  - Keep bed low and locked with side rails adjusted as appropriate  - Keep care items and personal belongings within reach  - Initiate and maintain comfort rounds  - Make Fall Risk Sign visible to staff  - Offer Toileting every 2 Hours, in advance of need  - Initiate/Maintain bed alarm  - Obtain necessary fall risk management equipment: yellow socks  - Apply yellow socks and bracelet for high fall risk patients  - Consider moving patient to room near nurses station  Outcome: Progressing  Goal: Maintain or return to baseline ADL function  Description: INTERVENTIONS:  -  Assess patient's ability to carry out ADLs; assess patient's baseline for ADL function and identify physical deficits which impact ability to perform ADLs (bathing, care of mouth/teeth, toileting, grooming, dressing, etc )  - Assess/evaluate cause of self-care deficits   - Assess range of motion  - Assess patient's mobility; develop plan if impaired  - Assess patient's need for assistive devices and provide as appropriate  - Encourage maximum independence but intervene and supervise when necessary  - Involve family in performance of ADLs  - Assess for home care needs following discharge   - Consider OT consult to assist with ADL evaluation and planning for discharge  - Provide patient education as appropriate  Outcome: Progressing  Goal: Maintains/Returns to pre admission functional level  Description: INTERVENTIONS:  - Perform BMAT or MOVE assessment daily    - Set and communicate daily mobility goal to care team and patient/family/caregiver  - Collaborate with rehabilitation services on mobility goals if consulted  - Perform Range of Motion 3 times a day  - Reposition patient every 2 hours  - Dangle patient 3 times a day  - Stand patient 3 times a day  - Ambulate patient 3 times a day  - Out of bed to chair 3 times a day   - Out of bed for meals 3 times a day  - Out of bed for toileting  - Record patient progress and toleration of activity level   Outcome: Progressing     Problem: Knowledge Deficit  Goal: Patient/family/caregiver demonstrates understanding of disease process, treatment plan, medications, and discharge instructions  Description: Complete learning assessment and assess knowledge base    Interventions:  - Provide teaching at level of understanding  - Provide teaching via preferred learning methods  Outcome: Progressing

## 2023-03-01 NOTE — PROGRESS NOTES
[place] [facility]  Progress Note - Armida Rodriguez 1990, 33 y o  female MRN: [id number]  Unit/Bed#: [address] [place] [address] Encounter: [id number]  Primary Care Provider: Ventura Lee MD   Date and time admitted to hospital: 2/28/2023  2:20 PM    * Seizure [facility])  Assessment & Plan  Asymptomatic since IP    Likely secondary to alcohol withdrawal versus family history of epilepsy, historical alcohol abuse with daily use    · Seizure at work, witnessed  · CT head-No acute intracranial abnormality  · Consider MRI if needed  · EtOH level<10  · CIWA in place, Librium protocol, MVI  · Neurology consulted  · Psychiatry recs inpatient alcohol treatment program    Alcohol abuse  Assessment & Plan  Chronic,    The patient states she drinks  per week  · CIWA-1, protocol in place, Librium taper  · Cessation counseling completed  · Psych consulted:recs inpatient alcohol rehab, patient agreed, initiate Lexapro 10 mg daily, Continue Librium 25 mg 3 times daily  · Psychotherapy  · Initiate Antabuse on discharge      Generalized anxiety disorder  Assessment & Plan  Chronic,    · Continue home Vistaril 50 mg and Lexapro 10 mg  · Appreciate psych recs as above    Insomnia  Assessment & Plan  · Melatonin 6 mg  · As needed Benadryl  · Consider trazodone if candidate, use with caution with alcohol dependence    Illicit drug use-resolved as of 3/1/2023  Assessment & Plan  Historical diagnosis    · UDS negative  · Counseled on aversion      VTE Pharmacologic Prophylaxis:   Moderate Risk (Score 3-4) - Pharmacological DVT Prophylaxis Ordered: enoxaparin (Lovenox)  Patient Centered Rounds: I performed bedside rounds with nursing staff today  Discussions with Specialists or Other Care Team Provider: Psych    Education and Discussions with Family / Patient: Updated  (father) at bedside      Total Time Spent on Date of Encounter in care of patient: 45 minutes This time was spent on one or more of the following: performing physical exam; counseling and coordination of care; obtaining or reviewing history; documenting in the medical record; reviewing/ordering tests, medications or procedures; communicating with other healthcare professionals and discussing with patient's family/caregivers  Current Length of Stay: 0 day(s)  Current Patient Status: Observation   Certification Statement: The patient will continue to require additional inpatient hospital stay due to Weaning off Librium, DT window, psych recs  Discharge Plan: Anticipate discharge in 24-48 hrs to home  Code Status: Level 1 - Full Code    Subjective:   Patient seen bedside with father present, denied headache, blurry vision, nausea, reported localized pain on back of head from falling during seizure  Patient reported waiting on psychiatry for evaluation and being open to inpatient rehab  Patient also agreed to aversion therapy like Antabuse on discharge  Patient's father made aware of other conditions patient reported drinking a pint of vodka a day for the last 5 years which father just became made aware of  Patient denies suicidal ideations  Objective:     Vitals:   Temp (24hrs), Av 6 °F (37 °C), Min:97 5 °F (36 4 °C), Max:99 1 °F (37 3 °C)    Temp:  [97 5 °F (36 4 °C)-99 1 °F (37 3 °C)] 98 5 °F (36 9 °C)  HR:  [62-90] 90  Resp:  [18-20] 18  BP: ()/(53-83) 107/69  SpO2:  [96 %-97 %] 96 %  Body mass index is 24 03 kg/m²  Input and Output Summary (last 24 hours):   No intake or output data in the 24 hours ending 23 [id number]    Physical Exam:   Physical Exam  Vitals and nursing note reviewed  Constitutional:       General: She is not in acute distress  Appearance: She is well-developed  HENT:      Head: Normocephalic and atraumatic  Eyes:      Conjunctiva/sclera: Conjunctivae normal    Cardiovascular:      Rate and Rhythm: Normal rate and regular rhythm  Heart sounds: No murmur heard  No friction rub  No gallop  Pulmonary:      Effort: Pulmonary effort is normal  No respiratory distress  Breath sounds: Normal breath sounds  No stridor  No wheezing, rhonchi or rales  Abdominal:      Palpations: Abdomen is soft  Tenderness: There is no abdominal tenderness  There is no guarding or rebound  Musculoskeletal:         General: No swelling or tenderness  Cervical back: Neck supple  Right lower leg: No edema  Left lower leg: No edema  Skin:     General: Skin is warm and dry  Capillary Refill: Capillary refill takes less than 2 seconds  Findings: No bruising  Neurological:      Mental Status: She is alert and oriented to person, place, and time  Motor: No weakness     Psychiatric:         Mood and Affect: Mood normal          Behavior: Behavior normal           Additional Data:     Labs:  Results from last 7 days   Lab Units 02/28/23  1440   WBC Thousand/uL 8 31   HEMOGLOBIN g/dL 14 2   HEMATOCRIT % 43 6   PLATELETS Thousands/uL 237   NEUTROS PCT % 50   LYMPHS PCT % 37   MONOS PCT % 11   EOS PCT % 1     Results from last 7 days   Lab Units 03/01/23  0538   SODIUM mmol/L 141   POTASSIUM mmol/L 3 8   CHLORIDE mmol/L 109*   CO2 mmol/L 27   BUN mg/dL 11   CREATININE mg/dL 0 84   ANION GAP mmol/L 5   CALCIUM mg/dL 8 6   ALBUMIN g/dL 3 6   TOTAL BILIRUBIN mg/dL 0 27   ALK PHOS U/L 36   ALT U/L 32   AST U/L 31   GLUCOSE RANDOM mg/dL 85     Results from last 7 days   Lab Units 02/28/23  1440   INR  0 94                   Lines/Drains:  Invasive Devices     Peripheral Intravenous Line  Duration           Peripheral IV 02/28/23 Left Antecubital 1 day                  Telemetry:  Telemetry Orders (From admission, onward)             24 Hour Telemetry Monitoring  Continuous x 24 Hours (Telem)        References:    Telemetry Guidelines   Question:  Reason for 24 Hour Telemetry  Answer:  Delirium Tremens with Hx of Heart Disease or Abnormal EKG                            Imaging: Reviewed radiology reports from this admission including: CT head and MRI brain    Recent Cultures (last 7 days):         Last 24 Hours Medication List:   Current Facility-Administered Medications   Medication Dose Route Frequency Provider Last Rate   • chlordiazePOXIDE  25 mg Oral Q8H Marisol Gutierrez MD     • diphenhydrAMINE  12 5 mg Oral Q8H PRN Alvino Fernandez MD     • escitalopram  10 mg Oral Daily Jasper Elizondo MD     • folic acid  1 mg Oral Daily Alvino Fernandez MD     • melatonin  6 mg Oral HS Alvino Feranndez MD     • multivitamin-minerals  1 tablet Oral Daily Alvino Fernandez MD     • thiamine  100 mg Oral Daily Alvino Fernandez MD          Today, Patient Was Seen By: Alvino Fernandez MD    **Please Note: This note may have been constructed using a voice recognition system  **

## 2023-03-01 NOTE — CONSULTS
[address]   Neurology Initial Consult    Graham Swanson is a 33 y o  female  [address] obtained from:   Chief Complaint   Patient presents with   • Seizure - New Onset     Pt had a witnessed seizure by a co worker, pt drinks vodka on a nightly basis         Assessment/Plan:    1  New onset seizure  2  EtOH abuse  3  Family history of epilepsy    -Seizure precautions  -Fall risk  -No EAD required for first time seizure activity, likely provoked  -MRI of the brain with without contrast seizure protocol  -EEG  -CIWA protocol per the medical team  -We will contact  to assist with notification through [facility]  Patient is a 58-year-old female with no significant PMH except for EtOH abuse  Patient states she started drinking alcohol due to insomnia this was helpful  Patient has drank heavily for multiple years, approximately 5 to 6 years and has been trying to slow down  Patient has decreased her intake of alcohol however is noted increased tremors in the morning before work  She has had to take a shot or 2 prior to work in order to treat the tremors  Patient stated being at lunch, next thing she knew she was in the ambulance  It was reported that she had fallen to the floor, was having shakes and locked extremities  She did suffer a tongue bite however had no incontinence  Patient acknowledged having postictal confusion when she came to  Patient has family history of epilepsy with her father, her brother did have 1 seizure in his early teenage years however has not had any in approximately 10 years  Patient has no personal history of seizure activity, this is her first episode  Suspect this was related to EtOH withdrawal as she has been cutting back and having symptoms with tremors  Patient also noted having had benzodiazepine with Xanax off the street    She stated she had started taking this a couple of months ago to help with her sleep  She noted that it was not doing anything for her sleep so she started weaning herself off of it  Patient stated that she had decreased her intake over time and has been off of the Xanax for approximately 1 week  Neurological exam, patient has no significant focal deficits  She is alert and oriented x4  Her speech is clear and fluent  Her gait is steady  She has equal sensations, reflexes and motor exam   MRI of the brain with without contrast order and remains pending  EEG also ordered due to new onset seizure activity  This is patient's first event, no need for AED at this time  Patient aware that this will be required to be reported to the [facility], her license is through [place]  Patient has history of DUI and is under [place] license for breathalyzer exam prior to use of her car  She has this ongoing for approximately the next year  Karina Sears will need follow up in 8-10 weeks with general attending or advance practitioner  She will not require outpatient neurological testing  If EEG unable to be completed in the inpatient setting, will need to be done in the outpatient arena  HPI:  Karina Sears is a 28yo female with PMH of depression, insomnia and heavy alcohol use who was brought to the ER after having a witnessed seizure at her place of employment  Characteristics of seizure reportedly explained however it was advised that patient approximately 1 minute of shaking and locked muscles  Patient stated she sat down to have lunch, the next thing she knew she was in the ambulance  Patient did suffer a tongue bite however denies having any urinary or stool incontinence  Patient stated she had a significant degree of confusion after she came to  Upon arrival to the ER patient's blood pressure was 120/77, her EtOH level was less than 10  She had a CTh done which was negative for any acute or abnormal findings  All labs were within normal ranges    Patient was admitted for new onset seizure, neurology consulted for work-up  Psychiatry also consulted for patient alcohol use  She had previously looking towards having treatment for this  Spoke with patient at bedside, she has no personal history of seizure activity  She does have a father who is epileptic  She has a brother who had seizure activity when he was in his early teenage years, noted 1 seizure however has not had any thereafter  Patient reported having insomnia, she noted that she had initially started drinking to help her sleep  This no longer works for her  She also recently started approximately 2 to 3 months ago on Xanax which she got off the street  Stated it was not helping her either so she slowly started to decrease the amount of Xanax she was taking and stopped taking this approximately a week or 2 ago  Patient does not have much information with regards to the seizure itself, noted the person who witnessed it was not available for discussion  She reported that she was drinking heavily, she is started to try to decrease the amount of alcohol that she has been taking in  She does note having some shakes in the morning before going to work, and at times has taken a couple of shots before work to alleviate the shaking  Patient stated her last drink was on Monday night  Suspect patient's seizure activity was provoked by alcohol withdrawal, possible benzodiazepine withdrawal although patient reports not having very consistent usage of this  We will get MRI of the brain to evaluate for any structural abnormalities as etiology given family history and EEG was ordered and remains pending at this time  Patient is aware seizure activity is required to be reported to the state of her license  Patient reports that her 's license is out of [place], continues to be through them due to a DUI that she had that requires ongoing breathalyzer testing before use of the car    This is going to be going on for about the next year before she can be discontinued and transfer her license locally  No past medical history on file  No past surgical history on file  No Known Allergies      Current Facility-Administered Medications:   •  [COMPLETED] chlordiazePOXIDE (LIBRIUM) capsule 50 mg, 50 mg, Oral, Q8H MARCIA, 50 mg at 23 0601 **FOLLOWED BY** chlordiazePOXIDE (LIBRIUM) capsule 25 mg, 25 mg, Oral, Q8H [facility], Erick Majano MD, 25 mg at 23 1358  •  diphenhydrAMINE (BENADRYL) tablet 12 5 mg, 12 5 mg, Oral, Q8H PRN, Erick Majano MD  •  escitalopram (LEXAPRO) tablet 10 mg, 10 mg, Oral, Daily, Re Saavedra MD, 10 mg at  2660  •  folic acid (FOLVITE) tablet 1 mg, 1 mg, Oral, Daily, Erick Majano MD, 1 mg at 23 1001  •  melatonin tablet 6 mg, 6 mg, Oral, HS, Erick Majano MD, 6 mg at 23 2133  •  multivitamin-minerals (CENTRUM) tablet 1 tablet, 1 tablet, Oral, Daily, Erick Majano MD, 1 tablet at 23 1001  •  thiamine tablet 100 mg, 100 mg, Oral, Daily, Erick Majano MD, 100 mg at 23 1001    Social History     Socioeconomic History   • Marital status:      Spouse name: Not on file   • Number of children: Not on file   • Years of education: Not on file   • Highest education level: Not on file   Occupational History   • Not on file   Tobacco Use   • Smoking status: Former     Types: Cigarettes     Quit date:      Years since quittin 1   • Smokeless tobacco: Never   Vaping Use   • Vaping Use: Every day   • Start date: 2022   • Substances: Nicotine   Substance and Sexual Activity   • Alcohol use: Not Currently     Comment: stop 2 weeks ago   • Drug use: Yes     Types:  Other, Marijuana     Comment: edibles   • Sexual activity: Yes     Partners: Female   Other Topics Concern   • Not on file   Social History Narrative   • Not on file     Social Determinants of Health     Financial Resource Strain: Not on file   Food Insecurity: Not on file Transportation Needs: Not on file   Physical Activity: Not on file   Stress: Not on file   Social Connections: Not on file   Intimate Partner Violence: Not on file   Housing Stability: Not on file       No family history on file  Review of systems:  Please see HPI for positive symptoms  Constitutional: No fever, no chills, no weight change  Ocular: No diplopia, no blurred vision, spots/zigzag lines  HEENT:  No sore throat, headache or congestion    + Sore tongue  COR:  No chest pain  No palpitations  Lungs:  no sob  GI:  no  nausea, no vomiting, no diarrhea, no constipation, no anorexia  :  No dysuria, frequency, or urgency  No hematuria  Musculoskeletal:  No joint pain or swelling   Skin:  No rash or itching  Psychiatric:  no anxiety, no depression  Endocrine:  No polyuria or polydipsia  Physical examination:  /69 (BP Location: Left arm)   Pulse 90   Temp 98 5 °F (36 9 °C) (Oral)   Resp 18   Ht 5' 4" (1 626 m)   Wt 63 5 kg (140 lb)   LMP 02/08/2023 (Within Days)   SpO2 96%   BMI 24 03 kg/m²     GENERAL APPEARANCE:  The patient is alert, oriented  HEENT:  Head is normocephalic  Pupils are equal and reactive     + Tongue bite  NECK:  Supple without lymphadenopathy  HEART:  Regular rate and rhythm  LUNGS:  clear to auscultation  No crackles or wheezes are heard  ABDOMEN:  Soft, nontender, nondistended with good bowel sounds heard  EXTREMITIES:  Without cyanosis, clubbing or edema  Mental status: The patient is alert, attentive, and oriented  Speech is clear and fluent, good repetition, comprehension, and naming  Cranial nerves:  CN II: Visual fields are full to confrontation  Fundoscopic exam is normal with sharp discs and no vascular changes  Pupils are 3 mm and briskly reactive to light  CN III, IV, VI: At primary gaze, there is no eye deviation  CN V: Facial sensation is intact to pinprick in all 3 divisions bilaterally     Corneal responses are intact  CN VII: Face is symmetric with normal eye closure and smile  CN VIII: Hearing is normal to rubbing fingers  CN IX, X: Palate elevates symmetrically  Phonation is normal   CN XI: Head turning and shoulder shrug are intact  CN XII: Tongue is midline with normal movements and no atrophy  Motor: There is no pronator drift of out-stretched arms  Muscle bulk and tone are normal    Muscle exam  Arm Right Left Leg Right Left   Deltoid 5/5 5/5 Iliopsoas 5/5 5/5   Biceps 5/5 5/5 Quads 5/5 5/5   Triceps 5/5 5/5 Hamstrings 5/5 5/5   Wrist Extension 5/5 5/5 Ankle Dorsi Flexion 5/5 5/5   Wrist Flexion 5/5 5/5 Ankle Plantar Flexion 5/5 5/5        Reflexes    RJ BJ TJ KJ AJ Plantars Thompson's   Right 2+ 2+ 2+ 2+ 2+ Downgoing Not present   Left 2+ 2+ 2+ 2+ 2+ Downgoing Not present      Sensory:  Light touch, Temperature, position sense, and vibration sense are intact in fingers and toes  Coordination:  Rapid alternating movements and fine finger movements are intact  There is no dysmetria on finger-to-nose and heel-knee-shin  There are no abnormal or extraneous movements  Romberg negative  Gait/Stance:  Normal heel/toe walking and tandem gait  Lab Results   Component Value Date    WBC 8 31 02/28/2023    HGB 14 2 02/28/2023    HCT 43 6 02/28/2023    MCV 98 02/28/2023     02/28/2023     No results found for: HGBA1C  Lab Results   Component Value Date    ALT 32 03/01/2023    AST 31 03/01/2023    ALKPHOS 36 03/01/2023     Lab Results   Component Value Date    CALCIUM 8 6 03/01/2023    K 3 8 03/01/2023    CO2 27 03/01/2023     (H) 03/01/2023    BUN 11 03/01/2023    CREATININE 0 84 03/01/2023         Review of reports and notes reveal:  Independent Interpretation of images or specimens:  CT head without contrast    Result Date: 2/28/2023  No acute intracranial abnormality  Workstation performed: [id number]       Thank you for this consult      Total time of encounter: 70 Minutes  More than 50% of time was spent in counseling and coordination of care of patient  Discussed with patient course of events, medical history, EtOH history, diagnostic studies pending, treatment recommendations and follow-up care    Discussed with medical team and neurology MD

## 2023-03-01 NOTE — ED NOTES
3/1/23 @ 1145:  PES met with 33year-old female after she met with Dr Federico Gordon, who recommended inpatient alcohol rehab  Patient is tentatively agreeable  Seems like main obstacle is work, but she says she may be able to get a 30 day leave of absence to attend rehab  PES provided information, then referred, with her permission to Judeth Sandhoff from [address] [place] [address] for further assistance  Patient gave [facility] permission to forward patient's contact information to Pinola Briana, and agreed to speak with her to discuss treatment options  PES forwarded information to Stephanie Melendez, MS

## 2023-03-01 NOTE — ASSESSMENT & PLAN NOTE
Asymptomatic since IP    Likely secondary to alcohol withdrawal versus family history of epilepsy, historical alcohol abuse with daily use    · Seizure at work, witnessed  · CT head-No acute intracranial abnormality  · Consider MRI if needed  · EtOH level<10  · CIWA in place, Librium protocol, MVI  · Neurology consulted  · Psychiatry recs inpatient alcohol treatment program

## 2023-03-02 ENCOUNTER — APPOINTMENT (OUTPATIENT)
Dept: NEUROLOGY | Facility: HOSPITAL | Age: 33
End: 2023-03-02

## 2023-03-02 LAB
ALBUMIN SERPL BCP-MCNC: 3.6 G/DL (ref 3.5–5)
ALP SERPL-CCNC: 37 U/L (ref 34–104)
ALT SERPL W P-5'-P-CCNC: 28 U/L (ref 7–52)
ANION GAP SERPL CALCULATED.3IONS-SCNC: 4 MMOL/L (ref 4–13)
AST SERPL W P-5'-P-CCNC: 24 U/L (ref 13–39)
BILIRUB SERPL-MCNC: 0.21 MG/DL (ref 0.2–1)
BUN SERPL-MCNC: 13 MG/DL (ref 5–25)
CALCIUM SERPL-MCNC: 8.6 MG/DL (ref 8.4–10.2)
CHLORIDE SERPL-SCNC: 106 MMOL/L (ref 96–108)
CO2 SERPL-SCNC: 29 MMOL/L (ref 21–32)
CREAT SERPL-MCNC: 0.89 MG/DL (ref 0.6–1.3)
GFR SERPL CREATININE-BSD FRML MDRD: 86 ML/MIN/1.73SQ M
GLUCOSE P FAST SERPL-MCNC: 104 MG/DL (ref 65–99)
GLUCOSE SERPL-MCNC: 104 MG/DL (ref 65–140)
POTASSIUM SERPL-SCNC: 4.4 MMOL/L (ref 3.5–5.3)
PROT SERPL-MCNC: 6.1 G/DL (ref 6.4–8.4)
SODIUM SERPL-SCNC: 139 MMOL/L (ref 135–147)

## 2023-03-02 RX ADMIN — MELATONIN TAB 3 MG 6 MG: 3 TAB at 21:07

## 2023-03-02 RX ADMIN — CHLORDIAZEPOXIDE HYDROCHLORIDE 25 MG: 25 CAPSULE ORAL at 05:19

## 2023-03-02 RX ADMIN — Medication 1 TABLET: at 08:12

## 2023-03-02 RX ADMIN — THIAMINE HCL TAB 100 MG 100 MG: 100 TAB at 08:13

## 2023-03-02 RX ADMIN — FOLIC ACID 1 MG: 1 TABLET ORAL at 08:13

## 2023-03-02 RX ADMIN — ESCITALOPRAM OXALATE 10 MG: 10 TABLET, FILM COATED ORAL at 08:14

## 2023-03-02 NOTE — ASSESSMENT & PLAN NOTE
Chronic,     The patient states she drinks  per week  · CIWA-1, protocol in place, Librium taper completed  · Patient is medically cleared for discharge  · Cessation counseling completed  · Psych consulted:recs inpatient alcohol rehab, patient agreed, initiate Lexapro 10 mg daily, Continue Librium 25 mg 3 times daily   Psychotherapy  · Initiate Antabuse on discharge

## 2023-03-02 NOTE — PLAN OF CARE
Problem: NEUROSENSORY - ADULT  Goal: Achieves stable or improved neurological status  Description: INTERVENTIONS  - Monitor and report changes in neurological status  - Monitor vital signs such as temperature, blood pressure, glucose, and any other labs ordered   - Initiate measures to prevent increased intracranial pressure  - Monitor for seizure activity and implement precautions if appropriate      Outcome: Progressing  Goal: Remains free of injury related to seizures activity  Description: INTERVENTIONS  - Maintain airway, patient safety  and administer oxygen as ordered  - Monitor patient for seizure activity, document and report duration and description of seizure to physician/advanced practitioner  - If seizure occurs,  ensure patient safety during seizure  - Reorient patient post seizure  - Seizure pads on all 4 side rails  - Instruct patient/family to notify RN of any seizure activity including if an aura is experienced  - Instruct patient/family to call for assistance with activity based on nursing assessment  - Administer anti-seizure medications if ordered    Outcome: Progressing  Goal: Achieves maximal functionality and self care  Description: INTERVENTIONS  - Monitor swallowing and airway patency with patient fatigue and changes in neurological status  - Encourage and assist patient to increase activity and self care     - Encourage visually impaired, hearing impaired and aphasic patients to use assistive/communication devices  Outcome: Progressing     Problem: PAIN - ADULT  Goal: Verbalizes/displays adequate comfort level or baseline comfort level  Description: Interventions:  - Encourage patient to monitor pain and request assistance  - Assess pain using appropriate pain scale  - Administer analgesics based on type and severity of pain and evaluate response  - Implement non-pharmacological measures as appropriate and evaluate response  - Consider cultural and social influences on pain and pain management  - Notify physician/advanced practitioner if interventions unsuccessful or patient reports new pain  Outcome: Progressing     Problem: INFECTION - ADULT  Goal: Absence or prevention of progression during hospitalization  Description: INTERVENTIONS:  - Assess and monitor for signs and symptoms of infection  - Monitor lab/diagnostic results  - Monitor all insertion sites, i e  indwelling lines, tubes, and drains  - Monitor endotracheal if appropriate and nasal secretions for changes in amount and color  - [place] appropriate cooling/warming therapies per order  - Administer medications as ordered  - Instruct and encourage patient and family to use good hand hygiene technique  - Identify and instruct in appropriate isolation precautions for identified infection/condition  Outcome: Progressing  Goal: Absence of fever/infection during neutropenic period  Description: INTERVENTIONS:  - Monitor WBC    Outcome: Progressing     Problem: SAFETY ADULT  Goal: Patient will remain free of falls  Description: INTERVENTIONS:  - Educate patient/family on patient safety including physical limitations  - Instruct patient to call for assistance with activity   - Consult OT/PT to assist with strengthening/mobility   - Keep Call bell within reach  - Keep bed low and locked with side rails adjusted as appropriate  - Keep care items and personal belongings within reach  - Initiate and maintain comfort rounds  - Make Fall Risk Sign visible to staff  - Apply yellow socks and bracelet for high fall risk patients  - Consider moving patient to room near nurses station  Outcome: Progressing  Goal: Maintain or return to baseline ADL function  Description: INTERVENTIONS:  -  Assess patient's ability to carry out ADLs; assess patient's baseline for ADL function and identify physical deficits which impact ability to perform ADLs (bathing, care of mouth/teeth, toileting, grooming, dressing, etc )  - Assess/evaluate cause of self-care deficits   - Assess range of motion  - Assess patient's mobility; develop plan if impaired  - Assess patient's need for assistive devices and provide as appropriate  - Encourage maximum independence but intervene and supervise when necessary  - Involve family in performance of ADLs  - Assess for home care needs following discharge   - Consider OT consult to assist with ADL evaluation and planning for discharge  - Provide patient education as appropriate  Outcome: Progressing  Goal: Maintains/Returns to pre admission functional level  Description: INTERVENTIONS:  - Perform BMAT or MOVE assessment daily    - Set and communicate daily mobility goal to care team and patient/family/caregiver  - Collaborate with rehabilitation services on mobility goals if consulted  - Out of bed for toileting  - Record patient progress and toleration of activity level   Outcome: Progressing     Problem: Knowledge Deficit  Goal: Patient/family/caregiver demonstrates understanding of disease process, treatment plan, medications, and discharge instructions  Description: Complete learning assessment and assess knowledge base    Interventions:  - Provide teaching at level of understanding  - Provide teaching via preferred learning methods  Outcome: Progressing

## 2023-03-02 NOTE — PROGRESS NOTES
Patient examined spoke to the nurse crisis worker's notes reviewed and noted  Patient is working on to do the paperwork to get a leave from work so she can go for inpatient alcohol rehab patient is very much agreeable with my recommendation she also agreed that after the inpatient alcohol rehab she will follow-up with outpatient psychiatrist and therapist she is encouraged to go to [address] she is very much motivated stating that she will not start drinking again after the discharge from the rehab and she will follow-up as an outpatient she seems to be motivated  Patient is on Librium for alcohol withdrawal no shakes noted she is pleasant and happy to see me communicate his feelings well patient is started on Lexapro no side effects of medication noted  She offers no new complaints  Patient is pleasant and cooperative  Medical treatment is in progress  No psychosis no hallucination patient is not suicidal   Therapy is done with good response  I will follow-up

## 2023-03-02 NOTE — PROGRESS NOTES
[address]  Progress Note - Daowod Mahan 1990, 33 y o  female MRN: [id number]  Unit/Bed#: [address] [place] [address] Encounter: [id number]  Primary Care Provider: Sharif Maria MD   Date and time admitted to hospital: 2/28/2023  2:20 PM    * New onset seizure [facility])  Assessment & Plan  Asymptomatic since IP    Likely secondary to alcohol withdrawal versus family history of epilepsy, historical alcohol abuse with daily use    · Seizure at work, witnessed  · CT head-No acute intracranial abnormality  · Consider MRINo intracranial pathology  · EtOH level<10  · CIWA in place, Librium protocol, MVI  · Neurology   · Psychiatry recs inpatient alcohol treatment program    Alcohol abuse  Assessment & Plan  Chronic,    The patient states she drinks  per week  · CIWA-, protocol in place, Librium taper  · Cessation counseling completed  · Psych consulted:recs inpatient alcohol rehab, patient agreed, initiate Lexapro 10 mg daily, Continue Librium 25 mg 3 times daily  · Psychotherapy  · Initiate Antabuse on discharge      Generalized anxiety disorder  Assessment & Plan  Chronic,    · Continue home Vistaril 50 mg and Lexapro 10 mg  · Appreciate psych recs as above    Insomnia  Assessment & Plan  · Melatonin 6 mg  · As needed Benadryl  · Consider trazodone if candidate, use with caution with alcohol dependence    Smoker  Assessment & Plan  Nicotine patch 14 mg      VTE Pharmacologic Prophylaxis:   Moderate Risk (Score 3-4) - Pharmacological DVT Prophylaxis Ordered: enoxaparin (Lovenox)  Patient Centered Rounds: I performed bedside rounds with nursing staff today  Discussions with Specialists or Other Care Team Provider: Psych    Education and Discussions with Family / Patient: Updated  (father) at bedside      Total Time Spent on Date of Encounter in care of patient: 45 minutes This time was spent on one or more of the following: performing physical exam; counseling and coordination of care; obtaining or reviewing history; documenting in the medical record; reviewing/ordering tests, medications or procedures; communicating with other healthcare professionals and discussing with patient's family/caregivers  Current Length of Stay: 0 day(s)  Current Patient Status: Observation   Certification Statement: The patient will continue to require additional inpatient hospital stay due to Weaning off Librium, DT window, psych recs  Discharge Plan: Anticipate discharge in 24-48 hrs to home  Code Status: Level 1 - Full Code    Subjective:   Patient seen bedside with father present, patient denied any headache, blurry vision, reported shakes are still the same, stated she is working on FMLA/[employer] from her job and informed she would need a physicians letter to prove that she was under medical care  Objective:     Vitals:   Temp (24hrs), Av 1 °F (36 7 °C), Min:97 3 °F (36 3 °C), Max:98 7 °F (37 1 °C)    Temp:  [97 3 °F (36 3 °C)-98 7 °F (37 1 °C)] 98 7 °F (37 1 °C)  HR:  [47-63] 59  Resp:  [16-18] 18  BP: ()/(48-67) 107/62  SpO2:  [95 %-96 %] 95 %  Body mass index is 24 03 kg/m²  Input and Output Summary (last 24 hours):   No intake or output data in the 24 hours ending 23 [id number]    Physical Exam:   Physical Exam  Vitals and nursing note reviewed  Constitutional:       General: She is not in acute distress  Appearance: She is well-developed  HENT:      Head: Normocephalic and atraumatic  Eyes:      Conjunctiva/sclera: Conjunctivae normal    Cardiovascular:      Rate and Rhythm: Normal rate and regular rhythm  Heart sounds: No murmur heard  No friction rub  No gallop  Pulmonary:      Effort: Pulmonary effort is normal  No respiratory distress  Breath sounds: Normal breath sounds  No stridor  No wheezing, rhonchi or rales  Abdominal:      Palpations: Abdomen is soft  Tenderness: There is no abdominal tenderness  There is no guarding or rebound     Musculoskeletal: General: No swelling or tenderness  Cervical back: Neck supple  Right lower leg: No edema  Left lower leg: No edema  Skin:     General: Skin is warm and dry  Capillary Refill: Capillary refill takes less than 2 seconds  Findings: No bruising  Neurological:      Mental Status: She is alert and oriented to person, place, and time  Motor: No weakness  Comments: Bilateral upper extremity tremors     Psychiatric:         Mood and Affect: Mood normal          Behavior: Behavior normal           Additional Data:     Labs:  Results from last 7 days   Lab Units 02/28/23  1440   WBC Thousand/uL 8 31   HEMOGLOBIN g/dL 14 2   HEMATOCRIT % 43 6   PLATELETS Thousands/uL 237   NEUTROS PCT % 50   LYMPHS PCT % 37   MONOS PCT % 11   EOS PCT % 1     Results from last 7 days   Lab Units 03/02/23  0457   SODIUM mmol/L 139   POTASSIUM mmol/L 4 4   CHLORIDE mmol/L 106   CO2 mmol/L 29   BUN mg/dL 13   CREATININE mg/dL 0 89   ANION GAP mmol/L 4   CALCIUM mg/dL 8 6   ALBUMIN g/dL 3 6   TOTAL BILIRUBIN mg/dL 0 21   ALK PHOS U/L 37   ALT U/L 28   AST U/L 24   GLUCOSE RANDOM mg/dL 104     Results from last 7 days   Lab Units 02/28/23  1440   INR  0 94                   Lines/Drains:  Invasive Devices     Peripheral Intravenous Line  Duration           Peripheral IV 02/28/23 Left Antecubital 2 days                  Telemetry:  Telemetry Orders (From admission, onward)             24 Hour Telemetry Monitoring  Continuous x 24 Hours (Telem)        References:    Telemetry Guidelines   Question:  Reason for 24 Hour Telemetry  Answer:  Delirium Tremens with Hx of Heart Disease or Abnormal EKG                            Imaging: Reviewed radiology reports from this admission including: CT head and MRI brain    Recent Cultures (last 7 days):         Last 24 Hours Medication List:   Current Facility-Administered Medications   Medication Dose Route Frequency Provider Last Rate   • diphenhydrAMINE  12 5 mg Oral Q8H PRN Luis Esquivel MD     • escitalopram  10 mg Oral Daily Darlene Shea MD     • folic acid  1 mg Oral Daily Luis Esquivel MD     • melatonin  6 mg Oral HS Luis Esquivel MD     • multivitamin-minerals  1 tablet Oral Daily Luis Esquivel MD     • thiamine  100 mg Oral Daily Luis Esquivel MD          Today, Patient Was Seen By: Luis Esquivel MD    **Please Note: This note may have been constructed using a voice recognition system  **

## 2023-03-02 NOTE — PLAN OF CARE
Problem: NEUROSENSORY - ADULT  Goal: Achieves stable or improved neurological status  Description: INTERVENTIONS  - Monitor and report changes in neurological status  - Monitor vital signs such as temperature, blood pressure, glucose, and any other labs ordered   - Initiate measures to prevent increased intracranial pressure  - Monitor for seizure activity and implement precautions if appropriate      Outcome: Progressing  Goal: Remains free of injury related to seizures activity  Description: INTERVENTIONS  - Maintain airway, patient safety  and administer oxygen as ordered  - Monitor patient for seizure activity, document and report duration and description of seizure to physician/advanced practitioner  - If seizure occurs,  ensure patient safety during seizure  - Reorient patient post seizure  - Seizure pads on all 4 side rails  - Instruct patient/family to notify RN of any seizure activity including if an aura is experienced  - Instruct patient/family to call for assistance with activity based on nursing assessment  - Administer anti-seizure medications if ordered    Outcome: Progressing  Goal: Achieves maximal functionality and self care  Description: INTERVENTIONS  - Monitor swallowing and airway patency with patient fatigue and changes in neurological status  - Encourage and assist patient to increase activity and self care     - Encourage visually impaired, hearing impaired and aphasic patients to use assistive/communication devices  Outcome: Progressing     Problem: PAIN - ADULT  Goal: Verbalizes/displays adequate comfort level or baseline comfort level  Description: Interventions:  - Encourage patient to monitor pain and request assistance  - Assess pain using appropriate pain scale  - Administer analgesics based on type and severity of pain and evaluate response  - Implement non-pharmacological measures as appropriate and evaluate response  - Consider cultural and social influences on pain and pain management  - Notify physician/advanced practitioner if interventions unsuccessful or patient reports new pain  Outcome: Progressing     Problem: INFECTION - ADULT  Goal: Absence or prevention of progression during hospitalization  Description: INTERVENTIONS:  - Assess and monitor for signs and symptoms of infection  - Monitor lab/diagnostic results  - Monitor all insertion sites, i e  indwelling lines, tubes, and drains  - Monitor endotracheal if appropriate and nasal secretions for changes in amount and color  - [place] appropriate cooling/warming therapies per order  - Administer medications as ordered  - Instruct and encourage patient and family to use good hand hygiene technique  - Identify and instruct in appropriate isolation precautions for identified infection/condition  Outcome: Progressing  Goal: Absence of fever/infection during neutropenic period  Description: INTERVENTIONS:  - Monitor WBC    Outcome: Progressing     Problem: SAFETY ADULT  Goal: Patient will remain free of falls  Description: INTERVENTIONS:  - Educate patient/family on patient safety including physical limitations  - Instruct patient to call for assistance with activity   - Consult OT/PT to assist with strengthening/mobility   - Keep Call bell within reach  - Keep bed low and locked with side rails adjusted as appropriate  - Keep care items and personal belongings within reach  - Initiate and maintain comfort rounds  - Make Fall Risk Sign visible to staff  - Offer Toileting every 2 Hours, in advance of need  - Initiate/Maintain bed alarm  - Obtain necessary fall risk management equipment: yellow socks  - Apply yellow socks and bracelet for high fall risk patients  - Consider moving patient to room near nurses station  Outcome: Progressing  Goal: Maintain or return to baseline ADL function  Description: INTERVENTIONS:  -  Assess patient's ability to carry out ADLs; assess patient's baseline for ADL function and identify physical deficits which impact ability to perform ADLs (bathing, care of mouth/teeth, toileting, grooming, dressing, etc )  - Assess/evaluate cause of self-care deficits   - Assess range of motion  - Assess patient's mobility; develop plan if impaired  - Assess patient's need for assistive devices and provide as appropriate  - Encourage maximum independence but intervene and supervise when necessary  - Involve family in performance of ADLs  - Assess for home care needs following discharge   - Consider OT consult to assist with ADL evaluation and planning for discharge  - Provide patient education as appropriate  Outcome: Progressing  Goal: Maintains/Returns to pre admission functional level  Description: INTERVENTIONS:  - Perform BMAT or MOVE assessment daily    - Set and communicate daily mobility goal to care team and patient/family/caregiver  - Collaborate with rehabilitation services on mobility goals if consulted  - Perform Range of Motion 3 times a day  - Reposition patient every 2 hours  - Dangle patient 3 times a day  - Stand patient 3 times a day  - Ambulate patient 3 times a day  - Out of bed to chair 3 times a day   - Out of bed for meals 3 times a day  - Out of bed for toileting  - Record patient progress and toleration of activity level   Outcome: Progressing     Problem: Knowledge Deficit  Goal: Patient/family/caregiver demonstrates understanding of disease process, treatment plan, medications, and discharge instructions  Description: Complete learning assessment and assess knowledge base    Interventions:  - Provide teaching at level of understanding  - Provide teaching via preferred learning methods  Outcome: Progressing

## 2023-03-02 NOTE — ASSESSMENT & PLAN NOTE
Asymptomatic since IP    Likely secondary to alcohol withdrawal versus family history of epilepsy, historical alcohol abuse with daily use    · Seizure at work, witnessed  · CT head-No acute intracranial abnormality  · Consider MRI:No intracranial pathology  · EtOH level<10  · CIWA completed, Librium protocol, MVI  · Neurology recs: DC  No AED  No driving for 6 months  F/u in 10-12 wks    · Psychiatry recs: inpatient alcohol treatment program

## 2023-03-02 NOTE — ED NOTES
GABI/Stephanie called ([phone]) @ 17:20 - "the patient is manipulating every one - told GABI - her FMLA leave was denied by her company because it wasn't for her family - was for herself - GABI feels the patient is not ready for tx"  Stephanie requested PES fax note of medical clearance tomorrow  Dr Pat Loza texted PES and asked PES to speak with patient in the morning on 3/3/23

## 2023-03-02 NOTE — UTILIZATION REVIEW
Continued Stay Review    Date: 03/02/2023                          Current Patient Class: Observation  Current Level of Care: Med/Surg    HPI:32 y o  female initially admitted on 02/28/2023     Assessment/Plan:  Reported shakes are still the same  Continue to monitor on telemetry  CIWA - Librium taper  CIWA Score:  1, 1, 1,   EEG completed, this morning  Neurology read was normal, no epileptiform discharges       Vital Signs: BP 95/67   Pulse 63   Temp 98 2 °F (36 8 °C)   Resp 17   Ht 5' 4" (1 626 m)   Wt 63 5 kg (140 lb)   LMP 02/08/2023 (Within Days)   SpO2 95%   BMI 24 03 kg/m²     Pertinent Labs/Diagnostic Results:   Results from last 7 days   Lab Units 02/28/23  1440   SARS-COV-2  Negative     Results from last 7 days   Lab Units 02/28/23  1440   WBC Thousand/uL 8 31   HEMOGLOBIN g/dL 14 2   HEMATOCRIT % 43 6   PLATELETS Thousands/uL 237   NEUTROS ABS Thousands/µL 4 16     Results from last 7 days   Lab Units 03/02/23  0457 03/01/23  0538 02/28/23  1440   SODIUM mmol/L 139 141 139   POTASSIUM mmol/L 4 4 3 8 5 0   CHLORIDE mmol/L 106 109* 105   CO2 mmol/L 29 27 28   ANION GAP mmol/L 4 5 6   BUN mg/dL 13 11 6   CREATININE mg/dL 0 89 0 84 0 88   EGFR ml/min/1 73sq m 86 92 87   CALCIUM mg/dL 8 6 8 6 9 3   MAGNESIUM mg/dL  --  1 8*  --      Results from last 7 days   Lab Units 03/02/23  0457 03/01/23  0538 02/28/23  1440   AST U/L 24 31 41*   ALT U/L 28 32 43   ALK PHOS U/L 37 36 42   TOTAL PROTEIN g/dL 6 1* 5 9* 6 8   ALBUMIN g/dL 3 6 3 6 4 2   TOTAL BILIRUBIN mg/dL 0 21 0 27 0 37     Results from last 7 days   Lab Units 03/02/23  0457 03/01/23  0538 02/28/23  1440   GLUCOSE RANDOM mg/dL 104 85 101     Results from last 7 days   Lab Units 02/28/23  1748 02/28/23  1440   HS TNI 0HR ng/L  --  3   HS TNI 2HR ng/L 6  --    HSTNI D2 ng/L 3  --      Results from last 7 days   Lab Units 02/28/23  1440   PROTIME seconds 12 7   INR  0 94   PTT seconds 27     Results from last 7 days   Lab Units 02/28/23  1440 TSH 3RD GENERATON uIU/mL 2 177     Results from last 7 days   Lab Units 02/28/23  1505   CLARITY UA  Clear   COLOR UA  Light Yellow   SPEC GRAV UA  1 010   PH UA  7 0   GLUCOSE UA mg/dl Negative   KETONES UA mg/dl Negative   BLOOD UA  Negative   PROTEIN UA mg/dl Negative   NITRITE UA  Negative   BILIRUBIN UA  Negative   UROBILINOGEN UA E U /dl 0 2   LEUKOCYTES UA  Negative     Results from last 7 days   Lab Units 02/28/23  1440   INFLUENZA A PCR  Negative   INFLUENZA B PCR  Negative   RSV PCR  Negative     Results from last 7 days   Lab Units 02/28/23  1505   AMPH/METH  Negative   BARBITURATE UR  Negative   BENZODIAZEPINE UR  Negative   COCAINE UR  Negative   METHADONE URINE  Negative   OPIATE UR  Negative   PCP UR  Negative   THC UR  Negative     Results from last 7 days   Lab Units 02/28/23  1440   ETHANOL LVL mg/dL <10   ACETAMINOPHEN LVL ug/mL <65*   SALICYLATE LVL mg/dL <5     Medications:   Scheduled Medications:  escitalopram, 10 mg, Oral, Daily  folic acid, 1 mg, Oral, Daily  melatonin, 6 mg, Oral, HS  multivitamin-minerals, 1 tablet, Oral, Daily  thiamine, 100 mg, Oral, Daily      Continuous IV Infusions:     PRN Meds:  diphenhydrAMINE, 12 5 mg, Oral, Q8H PRN        Discharge Plan: TBD - IP ETOH Treatment Program       Network Utilization Review Department  ATTENTION: Please call with any questions or concerns to [phone] and carefully listen to the prompts so that you are directed to the right person  All voicemails are confidential   Johanna Proctor all requests for admission clinical reviews, approved or denied determinations and any other requests to dedicated fax number below belonging to the campus where the patient is receiving treatment  List of dedicated fax numbers for the Facilities:  [address] DENIALS (Administrative/Medical Necessity) [phone]   [address]  (Maternity/NICU/Pediatrics) [phone]   [address] [phone]     [address] [facility] 77 [phone]   [address] [place] [address] [facility] 28 [facility] 310 [facility] 134 [address] [facility] [phone]

## 2023-03-02 NOTE — ED NOTES
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

## 2023-03-03 VITALS
SYSTOLIC BLOOD PRESSURE: 105 MMHG | DIASTOLIC BLOOD PRESSURE: 60 MMHG | BODY MASS INDEX: 23.9 KG/M2 | WEIGHT: 140 LBS | TEMPERATURE: 97.9 F | HEIGHT: 64 IN | RESPIRATION RATE: 18 BRPM | OXYGEN SATURATION: 98 % | HEART RATE: 65 BPM

## 2023-03-03 LAB
ALBUMIN SERPL BCP-MCNC: 3.7 G/DL (ref 3.5–5)
ALP SERPL-CCNC: 36 U/L (ref 34–104)
ALT SERPL W P-5'-P-CCNC: 27 U/L (ref 7–52)
ANION GAP SERPL CALCULATED.3IONS-SCNC: 4 MMOL/L (ref 4–13)
AST SERPL W P-5'-P-CCNC: 24 U/L (ref 13–39)
BILIRUB SERPL-MCNC: 0.38 MG/DL (ref 0.2–1)
BUN SERPL-MCNC: 13 MG/DL (ref 5–25)
CALCIUM SERPL-MCNC: 8.7 MG/DL (ref 8.4–10.2)
CHLORIDE SERPL-SCNC: 107 MMOL/L (ref 96–108)
CO2 SERPL-SCNC: 29 MMOL/L (ref 21–32)
CREAT SERPL-MCNC: 0.84 MG/DL (ref 0.6–1.3)
GFR SERPL CREATININE-BSD FRML MDRD: 92 ML/MIN/1.73SQ M
GLUCOSE P FAST SERPL-MCNC: 93 MG/DL (ref 65–99)
GLUCOSE SERPL-MCNC: 93 MG/DL (ref 65–140)
POTASSIUM SERPL-SCNC: 5 MMOL/L (ref 3.5–5.3)
PROT SERPL-MCNC: 6.2 G/DL (ref 6.4–8.4)
SODIUM SERPL-SCNC: 140 MMOL/L (ref 135–147)

## 2023-03-03 RX ORDER — PHENOL 1.4 %
10 AEROSOL, SPRAY (ML) MUCOUS MEMBRANE
Qty: 30 TABLET | Refills: 1 | Status: SHIPPED | OUTPATIENT
Start: 2023-03-03

## 2023-03-03 RX ORDER — FOLIC ACID 1 MG/1
1 TABLET ORAL DAILY
Qty: 30 TABLET | Refills: 2 | Status: SHIPPED | OUTPATIENT
Start: 2023-03-04

## 2023-03-03 RX ORDER — DISULFIRAM 250 MG/1
250 TABLET ORAL DAILY
Qty: 30 TABLET | Refills: 1 | Status: SHIPPED | OUTPATIENT
Start: 2023-03-03

## 2023-03-03 RX ORDER — LANOLIN ALCOHOL/MO/W.PET/CERES
100 CREAM (GRAM) TOPICAL DAILY
Qty: 30 TABLET | Refills: 1 | Status: SHIPPED | OUTPATIENT
Start: 2023-03-04

## 2023-03-03 RX ADMIN — FOLIC ACID 1 MG: 1 TABLET ORAL at 09:32

## 2023-03-03 RX ADMIN — ESCITALOPRAM OXALATE 10 MG: 10 TABLET, FILM COATED ORAL at 09:32

## 2023-03-03 RX ADMIN — Medication 1 TABLET: at 09:32

## 2023-03-03 RX ADMIN — THIAMINE HCL TAB 100 MG 100 MG: 100 TAB at 09:32

## 2023-03-03 NOTE — PLAN OF CARE
Problem: NEUROSENSORY - ADULT  Goal: Achieves stable or improved neurological status  Description: INTERVENTIONS  - Monitor and report changes in neurological status  - Monitor vital signs such as temperature, blood pressure, glucose, and any other labs ordered   - Initiate measures to prevent increased intracranial pressure  - Monitor for seizure activity and implement precautions if appropriate      Outcome: Progressing  Goal: Remains free of injury related to seizures activity  Description: INTERVENTIONS  - Maintain airway, patient safety  and administer oxygen as ordered  - Monitor patient for seizure activity, document and report duration and description of seizure to physician/advanced practitioner  - If seizure occurs,  ensure patient safety during seizure  - Reorient patient post seizure  - Seizure pads on all 4 side rails  - Instruct patient/family to notify RN of any seizure activity including if an aura is experienced  - Instruct patient/family to call for assistance with activity based on nursing assessment  - Administer anti-seizure medications if ordered    Outcome: Progressing  Goal: Achieves maximal functionality and self care  Description: INTERVENTIONS  - Monitor swallowing and airway patency with patient fatigue and changes in neurological status  - Encourage and assist patient to increase activity and self care     - Encourage visually impaired, hearing impaired and aphasic patients to use assistive/communication devices  Outcome: Progressing     Problem: PAIN - ADULT  Goal: Verbalizes/displays adequate comfort level or baseline comfort level  Description: Interventions:  - Encourage patient to monitor pain and request assistance  - Assess pain using appropriate pain scale  - Administer analgesics based on type and severity of pain and evaluate response  - Implement non-pharmacological measures as appropriate and evaluate response  - Consider cultural and social influences on pain and pain management  - Notify physician/advanced practitioner if interventions unsuccessful or patient reports new pain  Outcome: Progressing     Problem: INFECTION - ADULT  Goal: Absence or prevention of progression during hospitalization  Description: INTERVENTIONS:  - Assess and monitor for signs and symptoms of infection  - Monitor lab/diagnostic results  - Monitor all insertion sites, i e  indwelling lines, tubes, and drains  - Monitor endotracheal if appropriate and nasal secretions for changes in amount and color  - [place] appropriate cooling/warming therapies per order  - Administer medications as ordered  - Instruct and encourage patient and family to use good hand hygiene technique  - Identify and instruct in appropriate isolation precautions for identified infection/condition  Outcome: Progressing  Goal: Absence of fever/infection during neutropenic period  Description: INTERVENTIONS:  - Monitor WBC    Outcome: Progressing     Problem: SAFETY ADULT  Goal: Patient will remain free of falls  Description: INTERVENTIONS:  - Educate patient/family on patient safety including physical limitations  - Instruct patient to call for assistance with activity   - Consult OT/PT to assist with strengthening/mobility   - Keep Call bell within reach  - Keep bed low and locked with side rails adjusted as appropriate  - Keep care items and personal belongings within reach  - Initiate and maintain comfort rounds  - Make Fall Risk Sign visible to staff  - Offer Toileting every 2 Hours, in advance of need  - Initiate/Maintain bed alarm  - Apply yellow socks and bracelet for high fall risk patients  - Consider moving patient to room near nurses station  Outcome: Progressing  Goal: Maintain or return to baseline ADL function  Description: INTERVENTIONS:  -  Assess patient's ability to carry out ADLs; assess patient's baseline for ADL function and identify physical deficits which impact ability to perform ADLs (bathing, care of mouth/teeth, toileting, grooming, dressing, etc )  - Assess/evaluate cause of self-care deficits   - Assess range of motion  - Assess patient's mobility; develop plan if impaired  - Assess patient's need for assistive devices and provide as appropriate  - Encourage maximum independence but intervene and supervise when necessary  - Involve family in performance of ADLs  - Assess for home care needs following discharge   - Consider OT consult to assist with ADL evaluation and planning for discharge  - Provide patient education as appropriate  Outcome: Progressing  Goal: Maintains/Returns to pre admission functional level  Description: INTERVENTIONS:  - Perform BMAT or MOVE assessment daily    - Set and communicate daily mobility goal to care team and patient/family/caregiver  - Collaborate with rehabilitation services on mobility goals if consulted  - Record patient progress and toleration of activity level   Outcome: Progressing     Problem: Knowledge Deficit  Goal: Patient/family/caregiver demonstrates understanding of disease process, treatment plan, medications, and discharge instructions  Description: Complete learning assessment and assess knowledge base    Interventions:  - Provide teaching at level of understanding  - Provide teaching via preferred learning methods  Outcome: Progressing

## 2023-03-03 NOTE — PLAN OF CARE
Problem: NEUROSENSORY - ADULT  Goal: Achieves stable or improved neurological status  Description: INTERVENTIONS  - Monitor and report changes in neurological status  - Monitor vital signs such as temperature, blood pressure, glucose, and any other labs ordered   - Initiate measures to prevent increased intracranial pressure  - Monitor for seizure activity and implement precautions if appropriate      Outcome: Progressing  Goal: Remains free of injury related to seizures activity  Description: INTERVENTIONS  - Maintain airway, patient safety  and administer oxygen as ordered  - Monitor patient for seizure activity, document and report duration and description of seizure to physician/advanced practitioner  - If seizure occurs,  ensure patient safety during seizure  - Reorient patient post seizure  - Seizure pads on all 4 side rails  - Instruct patient/family to notify RN of any seizure activity including if an aura is experienced  - Instruct patient/family to call for assistance with activity based on nursing assessment  - Administer anti-seizure medications if ordered    Outcome: Progressing  Goal: Achieves maximal functionality and self care  Description: INTERVENTIONS  - Monitor swallowing and airway patency with patient fatigue and changes in neurological status  - Encourage and assist patient to increase activity and self care     - Encourage visually impaired, hearing impaired and aphasic patients to use assistive/communication devices  Outcome: Progressing     Problem: PAIN - ADULT  Goal: Verbalizes/displays adequate comfort level or baseline comfort level  Description: Interventions:  - Encourage patient to monitor pain and request assistance  - Assess pain using appropriate pain scale  - Administer analgesics based on type and severity of pain and evaluate response  - Implement non-pharmacological measures as appropriate and evaluate response  - Consider cultural and social influences on pain and pain management  - Notify physician/advanced practitioner if interventions unsuccessful or patient reports new pain  Outcome: Progressing     Problem: INFECTION - ADULT  Goal: Absence or prevention of progression during hospitalization  Description: INTERVENTIONS:  - Assess and monitor for signs and symptoms of infection  - Monitor lab/diagnostic results  - Monitor all insertion sites, i e  indwelling lines, tubes, and drains  - Monitor endotracheal if appropriate and nasal secretions for changes in amount and color  - [place] appropriate cooling/warming therapies per order  - Administer medications as ordered  - Instruct and encourage patient and family to use good hand hygiene technique  - Identify and instruct in appropriate isolation precautions for identified infection/condition  Outcome: Progressing  Goal: Absence of fever/infection during neutropenic period  Description: INTERVENTIONS:  - Monitor WBC    Outcome: Progressing     Problem: SAFETY ADULT  Goal: Patient will remain free of falls  Description: INTERVENTIONS:  - Educate patient/family on patient safety including physical limitations  - Instruct patient to call for assistance with activity   - Consult OT/PT to assist with strengthening/mobility   - Keep Call bell within reach  - Keep bed low and locked with side rails adjusted as appropriate  - Keep care items and personal belongings within reach  - Initiate and maintain comfort rounds  - Make Fall Risk Sign visible to staff  - Offer Toileting every 2 Hours, in advance of need  - Apply yellow socks and bracelet for high fall risk patients  - Consider moving patient to room near nurses station  Outcome: Progressing  Goal: Maintain or return to baseline ADL function  Description: INTERVENTIONS:  -  Assess patient's ability to carry out ADLs; assess patient's baseline for ADL function and identify physical deficits which impact ability to perform ADLs (bathing, care of mouth/teeth, toileting, grooming, dressing, etc )  - Assess/evaluate cause of self-care deficits   - Assess range of motion  - Assess patient's mobility; develop plan if impaired  - Assess patient's need for assistive devices and provide as appropriate  - Encourage maximum independence but intervene and supervise when necessary  - Involve family in performance of ADLs  - Assess for home care needs following discharge   - Consider OT consult to assist with ADL evaluation and planning for discharge  - Provide patient education as appropriate  Outcome: Progressing  Goal: Maintains/Returns to pre admission functional level  Description: INTERVENTIONS:  - Perform BMAT or MOVE assessment daily    - Set and communicate daily mobility goal to care team and patient/family/caregiver  - Collaborate with rehabilitation services on mobility goals if consulted  - Perform Range of Motion 4 times a day  - Reposition patient every 2 hours  - Dangle patient 2 times a day  - Stand patient 3 times a day  - Ambulate patient 2 times a day  - Out of bed to chair 2 times a day   - Out of bed for meals 2 times a day  - Out of bed for toileting  - Record patient progress and toleration of activity level   Outcome: Progressing     Problem: Knowledge Deficit  Goal: Patient/family/caregiver demonstrates understanding of disease process, treatment plan, medications, and discharge instructions  Description: Complete learning assessment and assess knowledge base    Interventions:  - Provide teaching at level of understanding  - Provide teaching via preferred learning methods  Outcome: Progressing

## 2023-03-03 NOTE — ED NOTES
3/3/23 @ 1120:  PES spoke to Clanton from [employer]; patient completed intake and is accepted; they will set up transport from home when patient is ready  PES will meet with patient to discuss what happens next  PES faxed discharge note to [facility]ANIE @ [phone]  Kevin MS    1130: PES met with patient and provided all information required, such as phone number to call when she's ready to be picked up  PES provided positive reinforcement and patient says, "I got this; don't worry; I'll be fine "  PES wished her luck and offered additional assistance if needed        Vincenzo Hutchins, MS

## 2023-03-03 NOTE — DISCHARGE SUMMARY
Jefry U  67   Discharge- Shahab Trejo 1990, 33 y o  female MRN: [id number]  Unit/Bed#: [address] [place] [address] Encounter: [id number]  Primary Care Provider: Maria Ines Sanchez MD   Date and time admitted to hospital: 2/28/2023  2:20 PM    * New onset seizure [facility])  Assessment & Plan  Asymptomatic since IP    Likely secondary to alcohol withdrawal versus family history of epilepsy, historical alcohol abuse with daily use    · Seizure at work, witnessed  · CT head-No acute intracranial abnormality  · Consider MRI:No intracranial pathology  · EtOH level<10  · CIWA completed, Librium protocol, MVI  · Neurology recs: DC  No AED  No driving for 6 months  F/u in 10-12 wks  · Psychiatry recs: inpatient alcohol treatment program    Alcohol abuse  Assessment & Plan  Chronic,     The patient states she drinks  per week  · CIWA-1, protocol in place, Librium taper completed  · Patient is medically cleared for discharge  · Cessation counseling completed  · Psych consulted:recs inpatient alcohol rehab, patient agreed, initiate Lexapro 10 mg daily, Continue Librium 25 mg 3 times daily  Psychotherapy  · Initiate Antabuse on discharge      Generalized anxiety disorder  Assessment & Plan  Chronic,    · Continue home Vistaril 50 mg and Lexapro 10 mg  · Appreciate psych recs as above    Insomnia  Assessment & Plan  · Melatonin 6 mg  · As needed Benadryl  · Consider trazodone if candidate, use with caution with alcohol dependence    Smoker  Assessment & Plan  Nicotine patch 14 mg    Medical Problems     Resolved Problems  Date Reviewed: 3/3/2023          Resolved    Illicit drug use 4/7/2495     Resolved by  Lisa German MD    Overview Signed 7/26/2022 11:34 AM by Maria Ines Sanchez MD     Uses edibles frequently  Counseled on cessation  Discharging Physician / Practitioner:  Lisa German MD  PCP: Maria Ines Sanchez MD  Admission Date:   Admission Orders (From admission, onward)     Ordered        02/28/23 [address] [facility] in Observation  Once                      Discharge Date: 03/03/23    Consultations During Hospital Stay:  · Neurology, psychiatry    Procedures Performed:   · N/A    Significant Findings / Test Results:   · N/A    Incidental Findings:   · N/A  ·     Test Results Pending at Discharge (will require follow up):   · N/A     Outpatient Tests Requested:  · CBC, CMP, O76, folate    Complications: N/A    Reason for Admission: Seizures    Hospital Course:   Shahab Trejo is a 33 y o  female patient who originally presented to the hospital on 2/28/2023 due to new onset seizure  Patient has long history of alcohol use on a daily basis, day of arrival patient had 2 shots in a m  and had witnessed seizure while at work  Patient reported no prior seizures and that she has been drinking up to a pint a day of vodka daily for 5 years to help with her insomnia  Patient seen by neurology, completed seizure work-up and imaging with negative findings, recommendations on discharge no AED, no driving for 6 months and follow-up with them in 10 to 12 weeks  Patient seen by psychiatry for alcohol dependence and initiated on Vistaril and Lexapro  Psychiatry recommendations for inpatient alcohol rehab and patient is agreeable; patient will also be discharged with Antabuse for alcohol use deterrent  Crisis assessment completed and patient has been medically cleared to facilitate transition towards rehab  Patient counseled on how insomnia worsens with chronic alcohol use and was given melatonin with improvement in sleeping per patient report  All other medical conditions managed with home meds or inpatient equivalent  Please see above list of diagnoses and related plan for additional information  Condition at Discharge: fair    Discharge Day Visit / Exam:   Subjective: Patient seen and examined at bedside, denied any headache, blurry vision, seizure-like activity palpitations, chest pain   patient had concerns prior to discharge regarding obtaining closely for rehab and issues with job [employer]  Patient made aware that will be given letter with anticipatory dates up to 30 days for inpatient rehab; patient to follow-up with PCP for additional documentation for job  Vitals: Blood Pressure: 105/60 (03/03/23 0740)  Pulse: 62 (03/03/23 0740)  Temperature: 97 9 °F (36 6 °C) (03/03/23 0740)  Temp Source: Oral (03/03/23 0740)  Respirations: 18 (03/03/23 0740)  Height: 5' 4" (162 6 cm) (02/28/23 1420)  Weight - Scale: 63 5 kg (140 lb) (02/28/23 1420)  SpO2: 98 % (03/03/23 0740)     Exam:   Physical Exam  Vitals and nursing note reviewed  Constitutional:       General: She is not in acute distress  Appearance: She is well-developed  HENT:      Head: Normocephalic and atraumatic  Eyes:      Conjunctiva/sclera: Conjunctivae normal    Cardiovascular:      Rate and Rhythm: Normal rate and regular rhythm  Heart sounds: No murmur heard  No friction rub  No gallop  Pulmonary:      Effort: Pulmonary effort is normal  No respiratory distress  Breath sounds: Normal breath sounds  No stridor  No wheezing, rhonchi or rales  Abdominal:      Palpations: Abdomen is soft  Tenderness: There is no abdominal tenderness  There is no guarding or rebound  Musculoskeletal:         General: No swelling or tenderness  Cervical back: Neck supple  Right lower leg: No edema  Left lower leg: No edema  Comments: Upper extremity minor tremors bilaterally   Skin:     General: Skin is warm and dry  Capillary Refill: Capillary refill takes less than 2 seconds  Findings: No bruising  Neurological:      Mental Status: She is alert and oriented to person, place, and time  Motor: No weakness  Psychiatric:         Mood and Affect: Mood normal          Behavior: Behavior normal           Discussion with Family: Updated  (father) via phone      Discharge instructions/Information to patient and family:   See after visit summary for information provided to patient and family  Provisions for Follow-Up Care:  See after visit summary for information related to follow-up care and any pertinent home health orders  Disposition:   Other: Home with transition to inpatient alcohol rehab facility    Planned Readmission: No     Discharge Statement:  I spent 45 minutes discharging the patient  This time was spent on the day of discharge  I had direct contact with the patient on the day of discharge  Greater than 50% of the total time was spent examining patient, answering all patient questions, arranging and discussing plan of care with patient as well as directly providing post-discharge instructions  Additional time then spent on discharge activities  Discharge Medications:  See after visit summary for reconciled discharge medications provided to patient and/or family        **Please Note: This note may have been constructed using a voice recognition system**

## 2023-03-03 NOTE — ED NOTES
3/3/23 @ 0925:  PES met with Dr Trixie Bence to discuss discharge plan  According to [address] [place] [address], patient hasn't agreed to complete intake for treatment, so the will continue to try after patient is discharged  It appears to this writer that there was some miscommunication between patient and staff, so PES will meet with patient  To clear up any misconceptions about treatment  Dr Trixie Bence will provide note of medical clearance and discharge, as well as produce a note for patient's employer  Kevin MS    0920: PES met with patient and addressed some areas of miscommunication  For example, the patient thought she had to go to treatment directly from hospital and not able to go home first to gather her belongings, which isn't the case  Patient can go home and gather whatever she needs, and schedule admission when it's convenient for her  It's not recommended because often times, when patient goes home, it's an opportunity to drink alcohol, and patient understood  Also, patient thought she had to complete intake in the hospital before she could be discharged, and this too, isn't the case  Patient is able to complete intake when it's convenient for her  Patient decided to complete intake as soon as possible, so PES set up and provided patient with number to call:  [phone]  PES notified Carmelina Monieort at [address] [place] [address], and note of medical clearance will be forwarded as soon as Dr Trixie Bence completes  PES provided patient with PES contact information and also stated that if there's a problem at [address] [place] [address], that there are many other options  PES met with Dr Trixie Bence and reviewed plan, and she will complete note of clearance        Miah Dejesus, MS

## 2023-03-03 NOTE — PROGRESS NOTES
Patient examined spoke to the nurse crisis worker's notes reviewed and noted  Patient is medically clear she is off the Librium no shakes no alcohol withdrawal noted patient is very much motivated for recommended treatment to start with inpatient alcohol rehab [facility] and she will go to inpatient alcohol rehab then she will follow-up at [facility] as an outpatient with a psychiatrist and therapist she is happy to see me she is smiling affect brighter she offers no new complaints and she feels much better and she feels that she got a lot of help here with her addiction she also agreed to continue her Lexapro for the depression  Patient offers no new complaints she is smiling her affect is brighter and she is stable at her baseline she is not suicidal no psychosis no hallucination no behavioral problem reported or noted  Patient is medically stable and she will be discharged later today I reviewed her medication  Continue her Lexapro 10 mg daily at this time

## 2023-03-03 NOTE — NURSING NOTE
Patient left unit via wheelchair in stable condition with all belongings accompanied by PCA  IV removed per protocol and occlusive dressing applied  AVS and all discharge instructions thoroughly reviewed with patient and were well understood

## 2023-03-03 NOTE — UTILIZATION REVIEW
Continued Stay Review    Date: 03/03/2023                          Current Patient Class: Observation  Current Level of Care: Med/Surg    HPI:32 y o  female initially admitted on 02/28/2023     Assessment/Plan: ***    Vital Signs: /60 (BP Location: Right arm)   Pulse 62   Temp 97 9 °F (36 6 °C) (Oral)   Resp 18   Ht 5' 4" (1 626 m)   Wt 63 5 kg (140 lb)   LMP 02/08/2023 (Within Days)   SpO2 98%   BMI 24 03 kg/m²     Pertinent Labs/Diagnostic Results:   Results from last 7 days   Lab Units 02/28/23  1440   SARS-COV-2  Negative     Results from last 7 days   Lab Units 02/28/23  1440   WBC Thousand/uL 8 31   HEMOGLOBIN g/dL 14 2   HEMATOCRIT % 43 6   PLATELETS Thousands/uL 237   NEUTROS ABS Thousands/µL 4 16     Results from last 7 days   Lab Units 03/03/23  0624 03/02/23  0457 03/01/23  0538 02/28/23  1440   SODIUM mmol/L 140 139 141 139   POTASSIUM mmol/L 5 0 4 4 3 8 5 0   CHLORIDE mmol/L 107 106 109* 105   CO2 mmol/L 29 29 27 28   ANION GAP mmol/L 4 4 5 6   BUN mg/dL 13 13 11 6   CREATININE mg/dL 0 84 0 89 0 84 0 88   EGFR ml/min/1 73sq m 92 86 92 87   CALCIUM mg/dL 8 7 8 6 8 6 9 3   MAGNESIUM mg/dL  --   --  1 8*  --      Results from last 7 days   Lab Units 03/03/23  0624 03/02/23  0457 03/01/23  0538 02/28/23  1440   AST U/L 24 24 31 41*   ALT U/L 27 28 32 43   ALK PHOS U/L 36 37 36 42   TOTAL PROTEIN g/dL 6 2* 6 1* 5 9* 6 8   ALBUMIN g/dL 3 7 3 6 3 6 4 2   TOTAL BILIRUBIN mg/dL 0 38 0 21 0 27 0 37     Results from last 7 days   Lab Units 03/03/23  0624 03/02/23  0457 03/01/23  0538 02/28/23  1440   GLUCOSE RANDOM mg/dL 93 104 85 101       Results from last 7 days   Lab Units 02/28/23  1748 02/28/23  1440   HS TNI 0HR ng/L  --  3   HS TNI 2HR ng/L 6  --    HSTNI D2 ng/L 3  --      Results from last 7 days   Lab Units 02/28/23  1440   PROTIME seconds 12 7   INR  0 94   PTT seconds 27     Results from last 7 days   Lab Units 02/28/23  1440   TSH 3RD GENERATON uIU/mL 2 177     Results from last 7 days Lab Units 02/28/23  1505   CLARITY UA  Clear   COLOR UA  Light Yellow   SPEC GRAV UA  1 010   PH UA  7 0   GLUCOSE UA mg/dl Negative   KETONES UA mg/dl Negative   BLOOD UA  Negative   PROTEIN UA mg/dl Negative   NITRITE UA  Negative   BILIRUBIN UA  Negative   UROBILINOGEN UA E U /dl 0 2   LEUKOCYTES UA  Negative     Results from last 7 days   Lab Units 02/28/23  1440   INFLUENZA A PCR  Negative   INFLUENZA B PCR  Negative   RSV PCR  Negative     Results from last 7 days   Lab Units 02/28/23  1505   AMPH/METH  Negative   BARBITURATE UR  Negative   BENZODIAZEPINE UR  Negative   COCAINE UR  Negative   METHADONE URINE  Negative   OPIATE UR  Negative   PCP UR  Negative   THC UR  Negative     Results from last 7 days   Lab Units 02/28/23  1440   ETHANOL LVL mg/dL <10   ACETAMINOPHEN LVL ug/mL <47*   SALICYLATE LVL mg/dL <5     Medications:   Scheduled Medications:  escitalopram, 10 mg, Oral, Daily  folic acid, 1 mg, Oral, Daily  melatonin, 6 mg, Oral, HS  multivitamin-minerals, 1 tablet, Oral, Daily  thiamine, 100 mg, Oral, Daily      Continuous IV Infusions:     PRN Meds:  diphenhydrAMINE, 12 5 mg, Oral, Q8H PRN        Discharge Plan: D    Network Utilization Review Department  ATTENTION: Please call with any questions or concerns to [phone] and carefully listen to the prompts so that you are directed to the right person  All voicemails are confidential   Justo Crews all requests for admission clinical reviews, approved or denied determinations and any other requests to dedicated fax number below belonging to the campus where the patient is receiving treatment   List of dedicated fax numbers for the Facilities:  [address] DENIALS (Administrative/Medical Necessity) [phone]   [address] [place] [address] (Maternity/NICU/Pediatrics) Loree Hankins [address] Sana Hooper  [place] [address] Medical [place] [address] [place] [address] Robert [address] [facility] [address] [facility] [place] [address] [phone]

## 2023-03-04 LAB
ATRIAL RATE: 65 BPM
P AXIS: 52 DEGREES
PR INTERVAL: 132 MS
QRS AXIS: 88 DEGREES
QRSD INTERVAL: 86 MS
QT INTERVAL: 434 MS
QTC INTERVAL: 451 MS
T WAVE AXIS: 59 DEGREES
VENTRICULAR RATE: 65 BPM

## 2023-03-08 ENCOUNTER — TELEPHONE (OUTPATIENT)
Dept: NEUROLOGY | Facility: CLINIC | Age: 33
End: 2023-03-08

## 2023-03-08 NOTE — TELEPHONE ENCOUNTER
1ST ATTEMPT - Called patient and LVM to call back to schedule HFU appt  Left our number        HFU/SLW/New Onset Seizure      NOTES: Magan Gutierrez need follow up in 8-10 weeks with general attending or advance practitioner  She will not require outpatient neurological testing

## 2023-04-04 ENCOUNTER — OFFICE VISIT (OUTPATIENT)
Dept: FAMILY MEDICINE CLINIC | Facility: CLINIC | Age: 33
End: 2023-04-04

## 2023-04-04 VITALS
OXYGEN SATURATION: 95 % | HEART RATE: 91 BPM | RESPIRATION RATE: 16 BRPM | TEMPERATURE: 97.7 F | WEIGHT: 144 LBS | DIASTOLIC BLOOD PRESSURE: 80 MMHG | SYSTOLIC BLOOD PRESSURE: 110 MMHG | BODY MASS INDEX: 24.59 KG/M2 | HEIGHT: 64 IN

## 2023-04-04 DIAGNOSIS — F10.982 ALCOHOL-INDUCED INSOMNIA (HCC): ICD-10-CM

## 2023-04-04 DIAGNOSIS — R56.9 NEW ONSET SEIZURE (HCC): ICD-10-CM

## 2023-04-04 DIAGNOSIS — F41.1 GENERALIZED ANXIETY DISORDER: ICD-10-CM

## 2023-04-04 DIAGNOSIS — F32.9 MAJOR DEPRESSIVE DISORDER WITH CURRENT ACTIVE EPISODE, UNSPECIFIED DEPRESSION EPISODE SEVERITY, UNSPECIFIED WHETHER RECURRENT: ICD-10-CM

## 2023-04-04 DIAGNOSIS — M54.6 RIGHT-SIDED THORACIC BACK PAIN, UNSPECIFIED CHRONICITY: Primary | ICD-10-CM

## 2023-04-04 DIAGNOSIS — F10.10 ALCOHOL ABUSE: ICD-10-CM

## 2023-04-04 DIAGNOSIS — G47.00 INSOMNIA, UNSPECIFIED TYPE: ICD-10-CM

## 2023-04-04 RX ORDER — QUETIAPINE FUMARATE 50 MG/1
50 TABLET, FILM COATED ORAL DAILY
COMMUNITY
Start: 2023-03-13 | End: 2023-04-04 | Stop reason: SDUPTHER

## 2023-04-04 RX ORDER — TRAZODONE HYDROCHLORIDE 100 MG/1
100 TABLET ORAL DAILY
Qty: 30 TABLET | Refills: 2 | Status: SHIPPED | OUTPATIENT
Start: 2023-04-04

## 2023-04-04 RX ORDER — LANOLIN ALCOHOL/MO/W.PET/CERES
100 CREAM (GRAM) TOPICAL DAILY
Qty: 30 TABLET | Refills: 2 | Status: SHIPPED | OUTPATIENT
Start: 2023-04-04

## 2023-04-04 RX ORDER — ROPINIROLE 0.5 MG/1
0.5 TABLET, FILM COATED ORAL DAILY
COMMUNITY
Start: 2023-03-24 | End: 2023-04-04 | Stop reason: SDUPTHER

## 2023-04-04 RX ORDER — HYDROXYZINE PAMOATE 50 MG/1
50 CAPSULE ORAL
Qty: 30 CAPSULE | Refills: 2 | Status: SHIPPED | OUTPATIENT
Start: 2023-04-04

## 2023-04-04 RX ORDER — NALTREXONE HYDROCHLORIDE 50 MG/1
50 TABLET, FILM COATED ORAL DAILY
Qty: 30 TABLET | Refills: 2 | Status: SHIPPED | OUTPATIENT
Start: 2023-04-04

## 2023-04-04 RX ORDER — NALTREXONE HYDROCHLORIDE 50 MG/1
50 TABLET, FILM COATED ORAL DAILY
COMMUNITY
Start: 2023-03-23 | End: 2023-04-04 | Stop reason: SDUPTHER

## 2023-04-04 RX ORDER — QUETIAPINE FUMARATE 50 MG/1
50 TABLET, FILM COATED ORAL DAILY
Qty: 30 TABLET | Refills: 2 | Status: SHIPPED | OUTPATIENT
Start: 2023-04-04

## 2023-04-04 RX ORDER — PHENOL 1.4 %
10 AEROSOL, SPRAY (ML) MUCOUS MEMBRANE
Qty: 30 TABLET | Refills: 2 | Status: SHIPPED | OUTPATIENT
Start: 2023-04-04

## 2023-04-04 RX ORDER — DISULFIRAM 250 MG/1
250 TABLET ORAL DAILY
Qty: 30 TABLET | Refills: 2 | Status: SHIPPED | OUTPATIENT
Start: 2023-04-04

## 2023-04-04 RX ORDER — FLUOXETINE HYDROCHLORIDE 20 MG/1
20 CAPSULE ORAL DAILY
COMMUNITY
Start: 2023-03-24

## 2023-04-04 RX ORDER — BUSPIRONE HYDROCHLORIDE 7.5 MG/1
7.5 TABLET ORAL 3 TIMES DAILY
COMMUNITY
Start: 2023-03-24

## 2023-04-04 RX ORDER — ROPINIROLE 0.5 MG/1
0.5 TABLET, FILM COATED ORAL DAILY
Qty: 30 TABLET | Refills: 2 | Status: SHIPPED | OUTPATIENT
Start: 2023-04-04

## 2023-04-04 RX ORDER — TRAZODONE HYDROCHLORIDE 100 MG/1
100 TABLET ORAL DAILY
COMMUNITY
Start: 2023-03-24 | End: 2023-04-04 | Stop reason: SDUPTHER

## 2023-04-04 RX ORDER — FOLIC ACID 1 MG/1
1 TABLET ORAL DAILY
Qty: 30 TABLET | Refills: 2 | Status: SHIPPED | OUTPATIENT
Start: 2023-04-04

## 2023-04-04 NOTE — ASSESSMENT & PLAN NOTE
Currently on trazodone, vistaril and seroquel which has been helping her sleep  Counseled on importance of avoiding alcohol while on these medications

## 2023-04-04 NOTE — PROGRESS NOTES
Name: Wilfredo Sy      : 1990      MRN: [id number]  Encounter Provider: Brittany Dial MD  Encounter Date: 2023   Encounter department: [facility]     1  Right-sided thoracic back pain, unspecified chronicity  Comments:  Likely musculoskeletal  Counseled on rest, heat, gentle stretches, OTC ibuprofen/tylenol  PT if symptoms persist      2  Generalized anxiety disorder  Assessment & Plan:  Stable on current medication regimen  Was seen by psychiatrist during recent hospitalization and rehab stay  Orders:  -     Ambulatory Referral to Psychiatry; Future  -     Ambulatory Referral to [facility]rap[facility]; Future    3  Alcohol abuse  Assessment & Plan:  She was recently in rehab, got out 2 weeks ago  Currently on disulfiram, naltrexone, folic acid and thiamine  Last drink was prior to hospitalization  Continue AA    Orders:  -     Ambulatory Referral to Psychiatry; Future  -     Ambulatory Referral to [facility]rap[facility]; Future    4  Major depressive disorder with current active episode, unspecified depression episode severity, unspecified whether recurrent  Assessment & Plan:  She was seen by psychiatry inpatient and started on multiple medications  I did agree to refill until she follows up outpatient with psychiatrist      Orders:  -     Ambulatory Referral to Psychiatry; Future  -     Ambulatory Referral to [facility]rap[facility]; Future    5  Alcohol-induced insomnia (HCC)  Assessment & Plan:  Currently on trazodone, vistaril and seroquel which has been helping her sleep  Counseled on importance of avoiding alcohol while on these medications  6  New onset seizure [facility])  Assessment & Plan:  She will follow up with neurology  Advised to schedule appointment  Orders:  -     Ambulatory Referral to Neurology; Future           Subjective      Back Pain  This is a new problem  Episode onset: a few months   The problem occurs intermittently  The problem has been waxing and waning since onset  Pain location: right thoracic  The quality of the pain is described as stabbing  The pain does not radiate  The pain is at a severity of 7/10  The symptoms are aggravated by position and bending  Pertinent negatives include no abdominal pain, bladder incontinence, bowel incontinence, chest pain, dysuria, fever, headaches, leg pain, numbness, paresis, paresthesias, pelvic pain, perianal numbness, tingling, weakness or weight loss  She has tried nothing for the symptoms  The treatment provided no relief  Eulalio Green was recently admitted inpatient at [facility] for a new onset seizure thought to be 2/2 alcohol withdrawal/abuse vs family hx of epilepsy  She was then discharged to alcohol rehab  Review of Systems   Constitutional: Negative for fever and weight loss  Cardiovascular: Negative for chest pain  Gastrointestinal: Negative for abdominal pain and bowel incontinence  Genitourinary: Negative for bladder incontinence, dysuria and pelvic pain  Musculoskeletal: Positive for back pain  Neurological: Negative for tingling, weakness, numbness, headaches and paresthesias  Current Outpatient Medications on File Prior to Visit   Medication Sig   • busPIRone (BUSPAR) 7 5 mg tablet Take 7 5 mg by mouth 3 (three) times a day   • disulfiram (ANTABUSE) 250 mg tablet Take 1 tablet (250 mg total) by mouth daily DO NOT TAKE WITH ALCOHOL   • escitalopram (Lexapro) 10 mg tablet Take 1 tablet (10 mg total) by mouth daily   • FLUoxetine (PROzac) 20 mg capsule Take 20 mg by mouth daily   • folic acid (FOLVITE) 1 mg tablet Take 1 tablet (1 mg total) by mouth daily Do not start before March 4, 2023     • hydrOXYzine pamoate (VISTARIL) 50 mg capsule Take 1 capsule (50 mg total) by mouth daily at bedtime   • Melatonin 10 MG TABS Take 1 tablet (10 mg total) by mouth daily at bedtime as needed (insomnia) Do NOT take with alcohol   • naltrexone "(REVIA) 50 mg tablet Take 50 mg by mouth in the morning   • QUEtiapine (SEROquel) 50 mg tablet Take 50 mg by mouth in the morning   • rOPINIRole (REQUIP) 0 5 mg tablet Take 0 5 mg by mouth in the morning   • thiamine 100 MG tablet Take 1 tablet (100 mg total) by mouth daily Do not start before March 4, 2023  • traZODone (DESYREL) 100 mg tablet Take 100 mg by mouth in the morning       Objective     /80 (BP Location: Right arm, Patient Position: Sitting, Cuff Size: Standard)   Pulse 91   Temp 97 7 °F (36 5 °C) (Temporal)   Resp 16   Ht 5' 4\" (1 626 m)   Wt 65 3 kg (144 lb)   SpO2 95%   BMI 24 72 kg/m²     Physical Exam  Constitutional:       General: She is not in acute distress  Appearance: She is well-developed  She is not diaphoretic  HENT:      Head: Normocephalic and atraumatic  Cardiovascular:      Rate and Rhythm: Normal rate and regular rhythm  Heart sounds: Normal heart sounds  No murmur heard  No friction rub  No gallop  Pulmonary:      Effort: Pulmonary effort is normal  No respiratory distress  Breath sounds: Normal breath sounds  No wheezing or rales  Chest:      Chest wall: No tenderness  Musculoskeletal:         General: No swelling, tenderness, deformity or signs of injury  Normal range of motion  Cervical back: Normal range of motion and neck supple  Skin:     General: Skin is warm and dry  Neurological:      Mental Status: She is alert and oriented to person, place, and time  Psychiatric:         Behavior: Behavior normal          Thought Content:  Thought content normal          Judgment: Judgment normal        Will MD Ambreen  "

## 2023-04-04 NOTE — ASSESSMENT & PLAN NOTE
She was recently in rehab, got out 2 weeks ago  Currently on disulfiram, naltrexone, folic acid and thiamine  Last drink was prior to hospitalization     Continue AA

## 2023-04-04 NOTE — ASSESSMENT & PLAN NOTE
She was seen by psychiatry inpatient and started on multiple medications   I did agree to refill until she follows up outpatient with psychiatrist

## 2023-04-04 NOTE — ASSESSMENT & PLAN NOTE
Stable on current medication regimen  Was seen by psychiatrist during recent hospitalization and rehab stay

## 2023-05-01 ENCOUNTER — TELEPHONE (OUTPATIENT)
Dept: PSYCHIATRY | Facility: CLINIC | Age: 33
End: 2023-05-01

## 2023-05-03 ENCOUNTER — TELEPHONE (OUTPATIENT)
Dept: PSYCHIATRY | Facility: CLINIC | Age: 33
End: 2023-05-03

## 2023-05-03 NOTE — TELEPHONE ENCOUNTER
Received referral from intake department due to history of alcohol withdrawal  Called patient to discuss our program and offer an intake appt, despite our providers not accepting new patients until further notice  She plans to call elsewhere

## 2023-07-26 DIAGNOSIS — F41.1 GENERALIZED ANXIETY DISORDER: Primary | ICD-10-CM

## 2023-07-27 RX ORDER — BUSPIRONE HYDROCHLORIDE 7.5 MG/1
7.5 TABLET ORAL 3 TIMES DAILY
Qty: 270 TABLET | Refills: 1 | Status: SHIPPED | OUTPATIENT
Start: 2023-07-27 | End: 2024-01-23

## 2023-07-27 RX ORDER — FLUOXETINE HYDROCHLORIDE 20 MG/1
20 CAPSULE ORAL DAILY
Qty: 90 CAPSULE | Refills: 1 | Status: SHIPPED | OUTPATIENT
Start: 2023-07-27

## 2023-07-28 ENCOUNTER — TELEPHONE (OUTPATIENT)
Dept: FAMILY MEDICINE CLINIC | Facility: CLINIC | Age: 33
End: 2023-07-28

## 2023-07-28 NOTE — TELEPHONE ENCOUNTER
The only record I have for her prozac is 20mg once daily. If this was changed by another doctor while she was at rehab, I would have to see records. Also- the max dosage anyone should take of prozac is 80mg daily. She should not be taking 40mg three times daily. That is way too much. She may have to come in for a visit with her records from rehab so that I can confirm her dosages since there is confusion with this.

## 2023-07-28 NOTE — TELEPHONE ENCOUNTER
I spoke to [place]. She said the Prozac is 40mg daily and buspar is 15mg tid. I also got the name of the rehab she was at [facility] [place]. I will call and see if they can send us something stating what she on.     Phone number - [phone]       Fatou Magallanes

## 2023-07-28 NOTE — TELEPHONE ENCOUNTER
Pt stated in message she takes Prozac 40mg TID and Buspar 15mg TID  Prescriptions were sent earlier this week Prozac 20mg daily and Buspar 7.5mg TID  Please advise  susan

## 2023-07-31 NOTE — TELEPHONE ENCOUNTER
Patient left a voicemail asking for an update? She wanted to know if we got ahold of them for her records being sent over and prescription dosage confirmation. She stated she gets Prozac 40mg daily and Buspar 15mg 3 times daily.     Cassie Ortiz LPN

## 2023-08-01 NOTE — TELEPHONE ENCOUNTER
Spoke with Moisés Marley at the [facility] facility and he will have someone reach out to me to straighten out the pt's medication dosage.   Annalise Gould

## 2023-09-05 ENCOUNTER — OFFICE VISIT (OUTPATIENT)
Dept: FAMILY MEDICINE CLINIC | Facility: CLINIC | Age: 33
End: 2023-09-05
Payer: COMMERCIAL

## 2023-09-05 VITALS
WEIGHT: 149 LBS | HEART RATE: 72 BPM | RESPIRATION RATE: 18 BRPM | TEMPERATURE: 97.2 F | BODY MASS INDEX: 25.44 KG/M2 | OXYGEN SATURATION: 98 % | SYSTOLIC BLOOD PRESSURE: 102 MMHG | HEIGHT: 64 IN | DIASTOLIC BLOOD PRESSURE: 60 MMHG

## 2023-09-05 DIAGNOSIS — G89.29 CHRONIC LOW BACK PAIN WITHOUT SCIATICA, UNSPECIFIED BACK PAIN LATERALITY: ICD-10-CM

## 2023-09-05 DIAGNOSIS — F32.9 MAJOR DEPRESSIVE DISORDER WITH CURRENT ACTIVE EPISODE, UNSPECIFIED DEPRESSION EPISODE SEVERITY, UNSPECIFIED WHETHER RECURRENT: ICD-10-CM

## 2023-09-05 DIAGNOSIS — M54.50 CHRONIC LOW BACK PAIN WITHOUT SCIATICA, UNSPECIFIED BACK PAIN LATERALITY: ICD-10-CM

## 2023-09-05 DIAGNOSIS — F41.1 GENERALIZED ANXIETY DISORDER: Primary | ICD-10-CM

## 2023-09-05 DIAGNOSIS — G47.00 INSOMNIA, UNSPECIFIED TYPE: ICD-10-CM

## 2023-09-05 PROCEDURE — 99214 OFFICE O/P EST MOD 30 MIN: CPT | Performed by: FAMILY MEDICINE

## 2023-09-05 RX ORDER — CLONIDINE HYDROCHLORIDE 0.1 MG/1
TABLET ORAL DAILY
COMMUNITY
Start: 2023-06-01

## 2023-09-05 RX ORDER — METHOCARBAMOL 750 MG/1
750 TABLET, FILM COATED ORAL EVERY 6 HOURS PRN
Qty: 20 TABLET | Refills: 0 | Status: SHIPPED | OUTPATIENT
Start: 2023-09-05

## 2023-09-05 RX ORDER — FLUOXETINE HYDROCHLORIDE 40 MG/1
40 CAPSULE ORAL DAILY
Qty: 90 CAPSULE | Refills: 1 | Status: SHIPPED | OUTPATIENT
Start: 2023-09-05

## 2023-09-05 RX ORDER — METHOCARBAMOL 750 MG/1
750 TABLET, FILM COATED ORAL
COMMUNITY
Start: 2023-06-01 | End: 2023-09-05 | Stop reason: SDUPTHER

## 2023-09-05 RX ORDER — BUSPIRONE HYDROCHLORIDE 15 MG/1
15 TABLET ORAL 3 TIMES DAILY
Qty: 270 TABLET | Refills: 0 | Status: SHIPPED | OUTPATIENT
Start: 2023-09-05 | End: 2023-12-04

## 2023-09-05 RX ORDER — ROPINIROLE 1 MG/1
TABLET, FILM COATED ORAL
COMMUNITY
Start: 2023-06-01

## 2023-09-05 RX ORDER — PROPRANOLOL HYDROCHLORIDE 10 MG/1
10 TABLET ORAL
COMMUNITY
Start: 2023-06-01

## 2023-09-05 RX ORDER — DOXEPIN HYDROCHLORIDE 50 MG/1
50 CAPSULE ORAL
COMMUNITY
Start: 2023-06-01 | End: 2023-09-05 | Stop reason: SDUPTHER

## 2023-09-05 RX ORDER — QUETIAPINE FUMARATE 100 MG/1
100 TABLET, FILM COATED ORAL
COMMUNITY
Start: 2023-06-01

## 2023-09-05 RX ORDER — DOXEPIN HYDROCHLORIDE 50 MG/1
50 CAPSULE ORAL
Qty: 30 CAPSULE | Refills: 1 | Status: SHIPPED | OUTPATIENT
Start: 2023-09-05

## 2023-09-05 NOTE — ASSESSMENT & PLAN NOTE
She reports being on doxepin and clonidine for sleep which was started while she was at alcohol rehab. I did provide refill of doxepin, but I would like records from her rehab to see why she was given clonidine as well. She is also on trazodone currently.

## 2023-09-05 NOTE — ASSESSMENT & PLAN NOTE
She is on multiple medications for anxiety/depression. I did provide refill today, however she will need to see psychiatry moving forward.

## 2023-09-05 NOTE — PROGRESS NOTES
Name: Asad Cordero      : 1990      MRN: [id number]  Encounter Provider: Cecilia Ferreira MD  Encounter Date: 2023   Encounter department: [facility]     1. Generalized anxiety disorder  Assessment & Plan:  She is on multiple medications for anxiety/depression. I did provide refill today, however she will need to see psychiatry moving forward. Orders:  -     busPIRone (BUSPAR) 15 mg tablet; Take 1 tablet (15 mg total) by mouth 3 (three) times a day  -     FLUoxetine (PROzac) 40 MG capsule; Take 1 capsule (40 mg total) by mouth daily  -     Ambulatory Referral to Psychiatry; Future    2. Major depressive disorder with current active episode, unspecified depression episode severity, unspecified whether recurrent  Assessment & Plan:  She is on multiple medications for anxiety/depression. I did provide refill today, however she will need to see psychiatry moving forward. Orders:  -     busPIRone (BUSPAR) 15 mg tablet; Take 1 tablet (15 mg total) by mouth 3 (three) times a day  -     FLUoxetine (PROzac) 40 MG capsule; Take 1 capsule (40 mg total) by mouth daily  -     Ambulatory Referral to Psychiatry; Future    3. Chronic low back pain without sciatica, unspecified back pain laterality  -     methocarbamol (ROBAXIN) 750 mg tablet; Take 1 tablet (750 mg total) by mouth every 6 (six) hours as needed for muscle spasms    4. Insomnia, unspecified type  Assessment & Plan:  She reports being on doxepin and clonidine for sleep which was started while she was at alcohol rehab. I did provide refill of doxepin, but I would like records from her rehab to see why she was given clonidine as well. She is also on trazodone currently. Orders:  -     doxepin (SINEquan) 50 mg capsule; Take 1 capsule (50 mg total) by mouth daily at bedtime as needed for sleep           Subjective      HPI   Terry Little is here today for follow up of alcohol abuse, anxiety and depression.    She went to alcohol rehab for the second time in May-2023 for 28 days. Last drink was before rehab. She states that while at rehab, her medications were changed for depression/anxiety/insomnia and she needs refills today. She feels well overall with no acute issues/complaints. PHQ-2/9 Depression Screening    Little interest or pleasure in doing things: 0 - not at all  Feeling down, depressed, or hopeless: 0 - not at all  Trouble falling or staying asleep, or sleeping too much: 0 - not at all  Feeling tired or having little energy: 1 - several days  Poor appetite or overeatin - not at all  Feeling bad about yourself - or that you are a failure or have let yourself or your family down: 0 - not at all  Trouble concentrating on things, such as reading the newspaper or watching television: 0 - not at all  Moving or speaking so slowly that other people could have noticed. Or the opposite - being so fidgety or restless that you have been moving around a lot more than usual: 0 - not at all  Thoughts that you would be better off dead, or of hurting yourself in some way: 0 - not at all  PHQ-9 Score: 1   PHQ-9 Interpretation: No or Minimal depression        WALKER-7 Flowsheet Screening    Flowsheet Row Most Recent Value   Over the last 2 weeks, how often have you been bothered by any of the following problems? Feeling nervous, anxious, or on edge 1   Not being able to stop or control worrying 0   Worrying too much about different things 0   Trouble relaxing 0   Being so restless that it is hard to sit still 0   Becoming easily annoyed or irritable 0   Feeling afraid as if something awful might happen 0   WALKER-7 Total Score 1        Review of Systems   Constitutional: Negative. HENT: Negative. Eyes: Negative. Respiratory: Negative. Cardiovascular: Negative. Gastrointestinal: Negative. Endocrine: Negative. Genitourinary: Negative. Musculoskeletal: Negative. Skin: Negative.     Allergic/Immunologic: Negative. Neurological: Negative. Hematological: Negative. Psychiatric/Behavioral: Negative.         Current Outpatient Medications on File Prior to Visit   Medication Sig   • cloNIDine (CATAPRES) 0.1 mg tablet in the morning   • disulfiram (ANTABUSE) 250 mg tablet Take 1 tablet (250 mg total) by mouth daily DO NOT TAKE WITH ALCOHOL   • folic acid (FOLVITE) 1 mg tablet Take 1 tablet (1 mg total) by mouth daily   • hydrOXYzine pamoate (VISTARIL) 50 mg capsule Take 1 capsule (50 mg total) by mouth daily at bedtime   • Melatonin 10 MG TABS Take 1 tablet (10 mg total) by mouth daily at bedtime as needed (insomnia) Do NOT take with alcohol   • naltrexone (REVIA) 50 mg tablet Take 1 tablet (50 mg total) by mouth in the morning   • propranolol (INDERAL) 10 mg tablet Take 10 mg by mouth daily at bedtime as needed   • QUEtiapine (SEROquel) 100 mg tablet Take 100 mg by mouth daily at bedtime as needed   • QUEtiapine (SEROquel) 50 mg tablet Take 1 tablet (50 mg total) by mouth in the morning   • rOPINIRole (REQUIP) 0.5 mg tablet Take 1 tablet (0.5 mg total) by mouth in the morning   • rOPINIRole (REQUIP) 1 mg tablet TAKE ONE TABLET BY MOUTH TWO TIMES A DAY AS NEEDED   • thiamine 100 MG tablet Take 1 tablet (100 mg total) by mouth daily   • traZODone (DESYREL) 100 mg tablet Take 1 tablet (100 mg total) by mouth in the morning   • [DISCONTINUED] busPIRone (BUSPAR) 7.5 mg tablet Take 1 tablet (7.5 mg total) by mouth 3 (three) times a day   • [DISCONTINUED] doxepin (SINEquan) 50 mg capsule Take 50 mg by mouth daily at bedtime as needed   • [DISCONTINUED] FLUoxetine (PROzac) 20 mg capsule Take 1 capsule (20 mg total) by mouth daily   • [DISCONTINUED] methocarbamol (ROBAXIN) 750 mg tablet Take 750 mg by mouth daily at bedtime as needed   • [DISCONTINUED] escitalopram (Lexapro) 10 mg tablet Take 1 tablet (10 mg total) by mouth daily (Patient not taking: Reported on 9/5/2023)       Objective     /60   Pulse 72   Temp [facility] ) 97.2 °F (36.2 °C)   Resp 18   Ht 5' 4" (1.626 m)   Wt 67.6 kg (149 lb)   LMP 08/02/2023 (Approximate)   SpO2 98%   BMI 25.58 kg/m²     Physical Exam  Constitutional:       General: She is not in acute distress. Appearance: She is well-developed. She is not diaphoretic. HENT:      Head: Normocephalic and atraumatic. Eyes:      General: No scleral icterus. Right eye: No discharge. Left eye: No discharge. Conjunctiva/sclera: Conjunctivae normal.   Pulmonary:      Effort: Pulmonary effort is normal.   Musculoskeletal:      Cervical back: Normal range of motion. Skin:     General: Skin is warm. Neurological:      Mental Status: She is alert and oriented to person, place, and time. Psychiatric:         Behavior: Behavior normal.         Thought Content:  Thought content normal.         Judgment: Judgment normal.       Julia Wagner MD

## 2023-09-19 ENCOUNTER — TELEPHONE (OUTPATIENT)
Dept: OTHER | Age: 33
End: 2023-09-19

## 2023-09-19 NOTE — TELEPHONE ENCOUNTER
Received a new pt referral to the [facility] form Armin Sahu at Outpatient Psych. Called and left a VM for Julia Ellis regarding the referral.  MAT office number provided. Will call her back at a later date.

## 2023-10-30 DIAGNOSIS — G47.00 INSOMNIA, UNSPECIFIED TYPE: ICD-10-CM

## 2023-10-30 RX ORDER — DOXEPIN HYDROCHLORIDE 50 MG/1
CAPSULE ORAL
Qty: 30 CAPSULE | Refills: 1 | Status: SHIPPED | OUTPATIENT
Start: 2023-10-30

## 2023-12-01 DIAGNOSIS — F32.9 MAJOR DEPRESSIVE DISORDER WITH CURRENT ACTIVE EPISODE, UNSPECIFIED DEPRESSION EPISODE SEVERITY, UNSPECIFIED WHETHER RECURRENT: ICD-10-CM

## 2023-12-01 DIAGNOSIS — F41.1 GENERALIZED ANXIETY DISORDER: ICD-10-CM

## 2023-12-01 RX ORDER — BUSPIRONE HYDROCHLORIDE 15 MG/1
15 TABLET ORAL 3 TIMES DAILY
Qty: 270 TABLET | Refills: 0 | Status: SHIPPED | OUTPATIENT
Start: 2023-12-01

## 2023-12-01 NOTE — TELEPHONE ENCOUNTER
Refill must be reviewed and completed by the office or provider.  The refill is unable to be approved by the medication management team.    Cannot be delegated

## 2023-12-20 ENCOUNTER — HOSPITAL ENCOUNTER (EMERGENCY)
Facility: HOSPITAL | Age: 33
Discharge: HOME | End: 2023-12-20
Attending: STUDENT IN AN ORGANIZED HEALTH CARE EDUCATION/TRAINING PROGRAM
Payer: COMMERCIAL

## 2023-12-20 VITALS
BODY MASS INDEX: 25.44 KG/M2 | SYSTOLIC BLOOD PRESSURE: 104 MMHG | HEIGHT: 64 IN | OXYGEN SATURATION: 97 % | WEIGHT: 149 LBS | RESPIRATION RATE: 20 BRPM | TEMPERATURE: 97.7 F | DIASTOLIC BLOOD PRESSURE: 57 MMHG | HEART RATE: 84 BPM

## 2023-12-20 DIAGNOSIS — E83.42 HYPOMAGNESEMIA: ICD-10-CM

## 2023-12-20 DIAGNOSIS — R11.2 NAUSEA & VOMITING: Primary | ICD-10-CM

## 2023-12-20 DIAGNOSIS — F10.10 CHRONIC ALCOHOL ABUSE: ICD-10-CM

## 2023-12-20 LAB
ALBUMIN SERPL BCP-MCNC: 4.4 G/DL (ref 3.5–5)
ALP SERPL-CCNC: 43 U/L (ref 34–104)
ALT SERPL W P-5'-P-CCNC: 43 U/L (ref 7–52)
ANION GAP SERPL CALCULATED.3IONS-SCNC: 24 MMOL/L
AST SERPL W P-5'-P-CCNC: 52 U/L (ref 13–39)
BASOPHILS # BLD MANUAL: 0 THOUSAND/UL (ref 0–0.1)
BASOPHILS NFR MAR MANUAL: 0 % (ref 0–1)
BILIRUB SERPL-MCNC: 0.87 MG/DL (ref 0.2–1)
BUN SERPL-MCNC: 22 MG/DL (ref 5–25)
CALCIUM SERPL-MCNC: 9.7 MG/DL (ref 8.4–10.2)
CHLORIDE SERPL-SCNC: 101 MMOL/L (ref 96–108)
CO2 SERPL-SCNC: 17 MMOL/L (ref 21–32)
CREAT SERPL-MCNC: 0.95 MG/DL (ref 0.6–1.3)
DACRYOCYTES BLD QL SMEAR: PRESENT
EOSINOPHIL # BLD MANUAL: 0.13 THOUSAND/UL (ref 0–0.4)
EOSINOPHIL NFR BLD MANUAL: 1 % (ref 0–6)
ERYTHROCYTE [DISTWIDTH] IN BLOOD BY AUTOMATED COUNT: 12.4 % (ref 11.6–15.1)
ETHANOL SERPL-MCNC: 88 MG/DL
GFR SERPL CREATININE-BSD FRML MDRD: 79 ML/MIN/1.73SQ M
GLUCOSE SERPL-MCNC: 110 MG/DL (ref 65–140)
HCG SERPL QL: NEGATIVE
HCT VFR BLD AUTO: 44.2 % (ref 34.8–46.1)
HGB BLD-MCNC: 15.1 G/DL (ref 11.5–15.4)
LYMPHOCYTES # BLD AUTO: 1.7 THOUSAND/UL (ref 0.6–4.47)
LYMPHOCYTES # BLD AUTO: 13 % (ref 14–44)
MAGNESIUM SERPL-MCNC: 1.6 MG/DL (ref 1.9–2.7)
MCH RBC QN AUTO: 30.3 PG (ref 26.8–34.3)
MCHC RBC AUTO-ENTMCNC: 34.2 G/DL (ref 31.4–37.4)
MCV RBC AUTO: 89 FL (ref 82–98)
MONOCYTES # BLD AUTO: 0.65 THOUSAND/UL (ref 0–1.22)
MONOCYTES NFR BLD: 5 % (ref 4–12)
NEUTROPHILS # BLD MANUAL: 10.6 THOUSAND/UL (ref 1.85–7.62)
NEUTS SEG NFR BLD AUTO: 81 % (ref 43–75)
PLATELET # BLD AUTO: 248 THOUSANDS/UL (ref 149–390)
PLATELET BLD QL SMEAR: ADEQUATE
PMV BLD AUTO: 9.9 FL (ref 8.9–12.7)
POLYCHROMASIA BLD QL SMEAR: PRESENT
POTASSIUM SERPL-SCNC: 3.9 MMOL/L (ref 3.5–5.3)
PROT SERPL-MCNC: 7.5 G/DL (ref 6.4–8.4)
RBC # BLD AUTO: 4.99 MILLION/UL (ref 3.81–5.12)
RBC MORPH BLD: PRESENT
SODIUM SERPL-SCNC: 142 MMOL/L (ref 135–147)
WBC # BLD AUTO: 13.09 THOUSAND/UL (ref 4.31–10.16)

## 2023-12-20 PROCEDURE — 85007 BL SMEAR W/DIFF WBC COUNT: CPT | Performed by: STUDENT IN AN ORGANIZED HEALTH CARE EDUCATION/TRAINING PROGRAM

## 2023-12-20 PROCEDURE — 84703 CHORIONIC GONADOTROPIN ASSAY: CPT | Performed by: STUDENT IN AN ORGANIZED HEALTH CARE EDUCATION/TRAINING PROGRAM

## 2023-12-20 PROCEDURE — 96365 THER/PROPH/DIAG IV INF INIT: CPT

## 2023-12-20 PROCEDURE — 85027 COMPLETE CBC AUTOMATED: CPT | Performed by: STUDENT IN AN ORGANIZED HEALTH CARE EDUCATION/TRAINING PROGRAM

## 2023-12-20 PROCEDURE — 83735 ASSAY OF MAGNESIUM: CPT | Performed by: STUDENT IN AN ORGANIZED HEALTH CARE EDUCATION/TRAINING PROGRAM

## 2023-12-20 PROCEDURE — 82077 ASSAY SPEC XCP UR&BREATH IA: CPT | Performed by: STUDENT IN AN ORGANIZED HEALTH CARE EDUCATION/TRAINING PROGRAM

## 2023-12-20 PROCEDURE — 80053 COMPREHEN METABOLIC PANEL: CPT | Performed by: STUDENT IN AN ORGANIZED HEALTH CARE EDUCATION/TRAINING PROGRAM

## 2023-12-20 PROCEDURE — 99284 EMERGENCY DEPT VISIT MOD MDM: CPT | Performed by: STUDENT IN AN ORGANIZED HEALTH CARE EDUCATION/TRAINING PROGRAM

## 2023-12-20 PROCEDURE — 36415 COLL VENOUS BLD VENIPUNCTURE: CPT | Performed by: STUDENT IN AN ORGANIZED HEALTH CARE EDUCATION/TRAINING PROGRAM

## 2023-12-20 PROCEDURE — 99284 EMERGENCY DEPT VISIT MOD MDM: CPT

## 2023-12-20 PROCEDURE — 96361 HYDRATE IV INFUSION ADD-ON: CPT

## 2023-12-20 PROCEDURE — 96375 TX/PRO/DX INJ NEW DRUG ADDON: CPT

## 2023-12-20 RX ORDER — LORAZEPAM 1 MG/1
1 TABLET ORAL ONCE
Status: COMPLETED | OUTPATIENT
Start: 2023-12-20 | End: 2023-12-20

## 2023-12-20 RX ORDER — MAGNESIUM SULFATE HEPTAHYDRATE 40 MG/ML
2 INJECTION, SOLUTION INTRAVENOUS ONCE
Status: COMPLETED | OUTPATIENT
Start: 2023-12-20 | End: 2023-12-20

## 2023-12-20 RX ORDER — DIAZEPAM 5 MG/ML
5 INJECTION, SOLUTION INTRAMUSCULAR; INTRAVENOUS ONCE
Status: DISCONTINUED | OUTPATIENT
Start: 2023-12-20 | End: 2023-12-20

## 2023-12-20 RX ORDER — ONDANSETRON 4 MG/1
4 TABLET, ORALLY DISINTEGRATING ORAL EVERY 6 HOURS PRN
Qty: 12 TABLET | Refills: 0 | Status: SHIPPED | OUTPATIENT
Start: 2023-12-20

## 2023-12-20 RX ORDER — ONDANSETRON 2 MG/ML
4 INJECTION INTRAMUSCULAR; INTRAVENOUS ONCE
Status: COMPLETED | OUTPATIENT
Start: 2023-12-20 | End: 2023-12-20

## 2023-12-20 RX ORDER — ONDANSETRON 4 MG/1
4 TABLET, ORALLY DISINTEGRATING ORAL ONCE
Status: COMPLETED | OUTPATIENT
Start: 2023-12-20 | End: 2023-12-20

## 2023-12-20 RX ADMIN — SODIUM CHLORIDE 1000 ML: 0.9 INJECTION, SOLUTION INTRAVENOUS at 08:40

## 2023-12-20 RX ADMIN — MAGNESIUM SULFATE HEPTAHYDRATE 2 G: 40 INJECTION, SOLUTION INTRAVENOUS at 09:28

## 2023-12-20 RX ADMIN — ONDANSETRON 4 MG: 2 INJECTION INTRAMUSCULAR; INTRAVENOUS at 08:45

## 2023-12-20 RX ADMIN — LORAZEPAM 1 MG: 1 TABLET ORAL at 09:27

## 2023-12-20 RX ADMIN — ONDANSETRON 4 MG: 4 TABLET, ORALLY DISINTEGRATING ORAL at 10:21

## 2023-12-20 NOTE — DISCHARGE INSTRUCTIONS
You were seen in the ED for alcohol use, withdrawal. You were treated symptomatically.     As discussed please follow up with your family doctor.     If you wish to seek help with your alcohol use you may return to the ED for further management.     Return to the ED for any seizures, persistent vomiting, inability to eat or drink, or for any other new or concerning symptoms.

## 2023-12-20 NOTE — ED PROVIDER NOTES
History  Chief Complaint   Patient presents with    Alcohol Intoxication     Patient comes this am due to alcohol withdrawal, seeking symptom relief, vomitted     Patient is a 33-year-old female, past medical history including alcohol abuse, who presents to the emergency department for alcohol withdrawal.  Patient states she drinks heavily (a pint of vodka daily for many years).  She states her last drink was last night.  Currently she feels very nauseous and has been vomiting and feels like she is going through withdrawal.  No chest pain or shortness of breath.  No abdominal pain.  States she has had alcohol withdrawal seizures in the past.  Does not want help with her alcohol use and just wants to feel better.  No other complaints or concerns.        Prior to Admission Medications   Prescriptions Last Dose Informant Patient Reported? Taking?   FLUoxetine (PROzac) 40 MG capsule   No No   Sig: Take 1 capsule (40 mg total) by mouth daily   Melatonin 10 MG TABS   No No   Sig: Take 1 tablet (10 mg total) by mouth daily at bedtime as needed (insomnia) Do NOT take with alcohol   QUEtiapine (SEROquel) 100 mg tablet   Yes No   Sig: Take 100 mg by mouth daily at bedtime as needed   QUEtiapine (SEROquel) 50 mg tablet   No No   Sig: Take 1 tablet (50 mg total) by mouth in the morning   busPIRone (BUSPAR) 15 mg tablet   No No   Sig: take 1 tablet by mouth three times a day   cloNIDine (CATAPRES) 0.1 mg tablet   Yes No   Sig: in the morning   disulfiram (ANTABUSE) 250 mg tablet   No No   Sig: Take 1 tablet (250 mg total) by mouth daily DO NOT TAKE WITH ALCOHOL   doxepin (SINEquan) 50 mg capsule   No No   Sig: take 1 capsule by mouth once daily at bedtime if needed  FOR SLEEP   folic acid (FOLVITE) 1 mg tablet   No No   Sig: Take 1 tablet (1 mg total) by mouth daily   hydrOXYzine pamoate (VISTARIL) 50 mg capsule   No No   Sig: Take 1 capsule (50 mg total) by mouth daily at bedtime   methocarbamol (ROBAXIN) 750 mg tablet   No No    Sig: Take 1 tablet (750 mg total) by mouth every 6 (six) hours as needed for muscle spasms   naltrexone (REVIA) 50 mg tablet   No No   Sig: Take 1 tablet (50 mg total) by mouth in the morning   propranolol (INDERAL) 10 mg tablet   Yes No   Sig: Take 10 mg by mouth daily at bedtime as needed   rOPINIRole (REQUIP) 0.5 mg tablet   No No   Sig: Take 1 tablet (0.5 mg total) by mouth in the morning   rOPINIRole (REQUIP) 1 mg tablet   Yes No   Sig: TAKE ONE TABLET BY MOUTH TWO TIMES A DAY AS NEEDED   thiamine 100 MG tablet   No No   Sig: Take 1 tablet (100 mg total) by mouth daily   traZODone (DESYREL) 100 mg tablet   No No   Sig: Take 1 tablet (100 mg total) by mouth in the morning      Facility-Administered Medications: None       Past Medical History:   Diagnosis Date    ETOH abuse     Seizures (HCC)        History reviewed. No pertinent surgical history.    History reviewed. No pertinent family history.  I have reviewed and agree with the history as documented.    E-Cigarette/Vaping    E-Cigarette Use Current Every Day User     Start Date 23     Cartridges/Day 1     Comments 50mg      E-Cigarette/Vaping Substances    Nicotine Yes     THC No     CBD No     Flavoring Yes     Other No     Unknown No      Social History     Tobacco Use    Smoking status: Former     Current packs/day: 0.00     Average packs/day: 0.3 packs/day for 12.0 years (3.0 ttl pk-yrs)     Types: Cigarettes     Start date:      Quit date:      Years since quittin.9    Smokeless tobacco: Never   Vaping Use    Vaping status: Every Day    Start date: 2022    Substances: Nicotine, Flavoring   Substance Use Topics    Alcohol use: Yes     Comment: a pint of vodka a day    Drug use: Not Currently     Comment: edibles       Review of Systems   Constitutional:  Negative for chills and fever.   Respiratory:  Negative for shortness of breath.    Cardiovascular:  Negative for chest pain.   Gastrointestinal:  Positive for nausea and  vomiting.   All other systems reviewed and are negative.      Physical Exam  Physical Exam  Vitals and nursing note reviewed.   Constitutional:       General: She is not in acute distress.     Appearance: She is well-developed. She is not ill-appearing, toxic-appearing or diaphoretic.   HENT:      Head: Normocephalic and atraumatic.      Comments: No tongue fasciculations     Right Ear: External ear normal.      Left Ear: External ear normal.      Nose: Nose normal.   Eyes:      General: Lids are normal. No scleral icterus.  Cardiovascular:      Rate and Rhythm: Regular rhythm. Tachycardia present.      Heart sounds: Normal heart sounds. No murmur heard.     No friction rub. No gallop.   Pulmonary:      Effort: Pulmonary effort is normal. No respiratory distress.      Breath sounds: Normal breath sounds. No wheezing or rales.   Abdominal:      Palpations: Abdomen is soft.      Tenderness: There is no abdominal tenderness. There is no guarding or rebound.   Musculoskeletal:         General: No deformity. Normal range of motion.      Cervical back: Normal range of motion and neck supple.   Skin:     General: Skin is warm and dry.   Neurological:      General: No focal deficit present.      Mental Status: She is alert.      Comments: No tremors   Psychiatric:         Mood and Affect: Mood normal.         Behavior: Behavior normal.         Vital Signs  ED Triage Vitals [12/20/23 0828]   Temperature Pulse Respirations Blood Pressure SpO2   97.7 °F (36.5 °C) (!) 108 20 156/71 97 %      Temp Source Heart Rate Source Patient Position - Orthostatic VS BP Location FiO2 (%)   Tympanic Monitor Lying Left arm --      Pain Score       --           Vitals:    12/20/23 0828 12/20/23 0954 12/20/23 1000 12/20/23 1030   BP: 156/71 102/60 109/64 104/57   Pulse: (!) 108 79 82 84   Patient Position - Orthostatic VS: Lying            Visual Acuity      ED Medications  Medications   sodium chloride 0.9 % bolus 1,000 mL (0 mL Intravenous  Stopped 12/20/23 1034)   ondansetron (ZOFRAN) injection 4 mg (4 mg Intravenous Given 12/20/23 0845)   magnesium sulfate 2 g/50 mL IVPB (premix) 2 g (0 g Intravenous Stopped 12/20/23 1008)   LORazepam (ATIVAN) tablet 1 mg (1 mg Oral Given 12/20/23 0927)   ondansetron (ZOFRAN-ODT) dispersible tablet 4 mg (4 mg Oral Given 12/20/23 1021)       Diagnostic Studies  Results Reviewed       Procedure Component Value Units Date/Time    RBC Morphology Reflex Test [[id number]] Collected: 12/20/23 0839    Lab Status: Final result Specimen: Blood from Arm, Left Updated: 12/20/23 1001    CBC and differential [[id number]]  (Abnormal) Collected: 12/20/23 0839    Lab Status: Final result Specimen: Blood from Arm, Left Updated: 12/20/23 0941     WBC 13.09 Thousand/uL      RBC 4.99 Million/uL      Hemoglobin 15.1 g/dL      Hematocrit 44.2 %      MCV 89 fL      MCH 30.3 pg      MCHC 34.2 g/dL      RDW 12.4 %      MPV 9.9 fL      Platelets 248 Thousands/uL     Narrative:      This is an appended report.  These results have been appended to a previously verified report.    Manual Differential(PHLEBS Do Not Order) [[id number]]  (Abnormal) Collected: 12/20/23 0839    Lab Status: Final result Specimen: Blood from Arm, Left Updated: 12/20/23 0941     Segmented % 81 %      Lymphocytes % 13 %      Monocytes % 5 %      Eosinophils, % 1 %      Basophils % 0 %      Absolute Neutrophils 10.60 Thousand/uL      Lymphocytes Absolute 1.70 Thousand/uL      Monocytes Absolute 0.65 Thousand/uL      Eosinophils Absolute 0.13 Thousand/uL      Basophils Absolute 0.00 Thousand/uL      Total Counted --     RBC Morphology Present     Platelet Estimate Adequate     Polychromasia Present     Tear Drop Cells Present    hCG, qualitative pregnancy [[id number]]  (Normal) Collected: 12/20/23 0839    Lab Status: Final result Specimen: Blood from Arm, Left Updated: 12/20/23 0914     Preg, Serum Negative    Comprehensive metabolic panel [[id number]]  (Abnormal) Collected:  12/20/23 0839    Lab Status: Final result Specimen: Blood from Arm, Left Updated: 12/20/23 0905     Sodium 142 mmol/L      Potassium 3.9 mmol/L      Chloride 101 mmol/L      CO2 17 mmol/L      ANION GAP 24 mmol/L      BUN 22 mg/dL      Creatinine 0.95 mg/dL      Glucose 110 mg/dL      Calcium 9.7 mg/dL      AST 52 U/L      ALT 43 U/L      Alkaline Phosphatase 43 U/L      Total Protein 7.5 g/dL      Albumin 4.4 g/dL      Total Bilirubin 0.87 mg/dL      eGFR 79 ml/min/1.73sq m     Narrative:      National Kid[employer] Foundation guidelines for Chronic Kidney Disease (CKD):     Stage 1 with normal or high GFR (GFR > 90 mL/min/1.73 square meters)    Stage 2 Mild CKD (GFR = 60-89 mL/min/1.73 square meters)    Stage 3A Moderate CKD (GFR = 45-59 mL/min/1.73 square meters)    Stage 3B Moderate CKD (GFR = 30-44 mL/min/1.73 square meters)    Stage 4 Severe CKD (GFR = 15-29 mL/min/1.73 square meters)    Stage 5 End Stage CKD (GFR <15 mL/min/1.73 square meters)  Note: GFR calculation is accurate only with a steady state creatinine    Ethanol [[id number]]  (Abnormal) Collected: 12/20/23 0839    Lab Status: Final result Specimen: Blood from Arm, Left Updated: 12/20/23 0905     Ethanol Lvl 88 mg/dL     Magnesium [[id number]]  (Abnormal) Collected: 12/20/23 0839    Lab Status: Final result Specimen: Blood from Arm, Left Updated: 12/20/23 0905     Magnesium 1.6 mg/dL                    No orders to display              Procedures  Procedures         ED Course  ED Course as of 12/20/23 1241   Wed Dec 20, 2023   0914 PREGNANCY, SERUM: Negative   0927 Patient reevaluated.  Resting comfortably.  Feels much better.  Tolerating p.o.  Does not want help with her alcohol cessation (declined OORP and crisis).  Will continue fluids and magnesium repletion, as well as Ativan.  Plan for discharge after all of this is completed.   1026 Patient again reevaluated.  All questions answered.  Discussed results and findings.  As there is no indication  for further workup or treatment in the emergency department at this time will discharge.    Recommended PCP follow-up.  Strict return precautions discussed.  Patient verbalized understanding and agreed to plan of care.                               SBIRT 22yo+      Flowsheet Row Most Recent Value   Initial Alcohol Screen: US AUDIT-C     1. How often do you have a drink containing alcohol? 6 Filed at: 12/20/2023 0831   2. How many drinks containing alcohol do you have on a typical day you are drinking?  5 Filed at: 12/20/2023 0831   3a. Male UNDER 65: How often do you have five or more drinks on one occasion? 0 Filed at: 12/20/2023 0831   3b. FEMALE Any Age, or MALE 65+: How often do you have 4 or more drinks on one occassion? 6 Filed at: 12/20/2023 0831   Audit-C Score 17 Filed at: 12/20/2023 0831   Full Alcohol Screen: US AUDIT    4. How often during the last year have you found that you were not able to stop drinking once you had started? 4 Filed at: 12/20/2023 0831   5. How often during past year have you failed to do what was normally expected of you because of drinking?  4 Filed at: 12/20/2023 0831   6. How often in past year have you needed a first drink in the morning to get yourself going after a heavy drinking session?  4 Filed at: 12/20/2023 0831   7. How often in past year have you had feeling of guilt or remorse after drinking?  4 Filed at: 12/20/2023 0831   8. How often in past year have you been unable to remember what happened night before because you had been drinking?  0 Filed at: 12/20/2023 0831   9. Have you or someone else been injured as a result of your drinking?  0 Filed at: 12/20/2023 0831   10. Has a relative, friend, doctor or other health worker been concerned about your drinking and suggested you cut down?  4 Filed at: 12/20/2023 0831   AUDIT Total Score 37 Filed at: 12/20/2023 0831   MELQUIADES: How many times in the past year have you...    Used an illegal drug or used a prescription  "medication for non-medical reasons? Never Filed at: 12/20/2023 0831                      Medical Decision Making  Patient is a 33 y.o. female who presents to the ED for nausea, vomiting, alcohol abuse.  Patient is nontoxic, well-appearing.  She is mildly tachycardic but otherwise vitals are stable.  Physical exam is unremarkable.    Differential includes but is not limited to: Alcohol abuse, mild alcohol withdrawal.    Patient offered resources, including detox and rehab.  Also discussed crisis evaluation.  Patient declined.    Plan: Labs, IV fluids, benzos as needed, reassessment                 Portions of the record may have been created with voice recognition software. Occasional wrong word or \"sound a like\" substitutions may have occurred due to the inherent limitations of voice recognition software. Read the chart carefully and recognize, using context, where substitutions have occurred.    Problems Addressed:  Chronic alcohol abuse: chronic illness or injury with exacerbation, progression, or side effects of treatment  Nausea & vomiting: acute illness or injury    Amount and/or Complexity of Data Reviewed  External Data Reviewed: notes.     Details: Prior hospitalization earlier this year reviewed; was admitted for alcohol withdrawal and seizure  Labs: ordered. Decision-making details documented in ED Course.    Risk  OTC drugs.  Prescription drug management.             Disposition  Final diagnoses:   Nausea & vomiting   Chronic alcohol abuse   Hypomagnesemia     Time reflects when diagnosis was documented in both MDM as applicable and the Disposition within this note       Time User Action Codes Description Comment    12/20/2023 10:21 AM Mohan Champion Add [R11.2] Nausea & vomiting     12/20/2023 10:21 AM Mohan Champion [F10.10] Chronic alcohol abuse     12/20/2023 11:38 AM Mohan Champion [E83.42] Hypomagnesemia           ED Disposition       ED Disposition   Discharge    Condition   Stable    " Date/Time   Wed Dec 20, 2023 0938    Comment   Lynn Spencer discharge to home/self care.                   Follow-up Information       Follow up With Specialties Details Why Contact Info Additional Information    Iona Cedillo MD Family Medicine   [address]  [address] [place] [id number] [phone]       [facility] Emergency Department Emergency Medicine   [address] [facility] [address]  [phone] [facility] Emergency Department, [address] [place], [place], [address]            Discharge Medication List as of 12/20/2023 10:23 AM        START taking these medications    Details   ondansetron (ZOFRAN-ODT) 4 mg disintegrating tablet Take 1 tablet (4 mg total) by mouth every 6 (six) hours as needed for nausea or vomiting, Starting Wed 12/20/2023, Normal           CONTINUE these medications which have NOT CHANGED    Details   busPIRone (BUSPAR) 15 mg tablet take 1 tablet by mouth three times a day, Starting Fri 12/1/2023, Normal      cloNIDine (CATAPRES) 0.1 mg tablet in the morning, Starting Thu 6/1/2023, Historical Med      disulfiram (ANTABUSE) 250 mg tablet Take 1 tablet (250 mg total) by mouth daily DO NOT TAKE WITH ALCOHOL, Starting Tue 4/4/2023, Normal      doxepin (SINEquan) 50 mg capsule take 1 capsule by mouth once daily at bedtime if needed  FOR SLEEP, Normal      FLUoxetine (PROzac) 40 MG capsule Take 1 capsule (40 mg total) by mouth daily, Starting Tue 9/5/2023, Normal      folic acid (FOLVITE) 1 mg tablet Take 1 tablet (1 mg total) by mouth daily, Starting Tue 4/4/2023, Normal      hydrOXYzine pamoate (VISTARIL) 50 mg capsule Take 1 capsule (50 mg total) by mouth daily at bedtime, Starting Tue 4/4/2023, Normal      Melatonin 10 MG TABS Take 1 tablet (10 mg total) by mouth daily at bedtime as needed (insomnia) Do NOT take with alcohol, Starting Tue 4/4/2023, Normal      methocarbamol (ROBAXIN) 750 mg tablet Take 1 tablet (750 mg  total) by mouth every 6 (six) hours as needed for muscle spasms, Starting Tue 9/5/2023, Normal      naltrexone (REVIA) 50 mg tablet Take 1 tablet (50 mg total) by mouth in the morning, Starting Tue 4/4/2023, Normal      propranolol (INDERAL) 10 mg tablet Take 10 mg by mouth daily at bedtime as needed, Starting Thu 6/1/2023, Historical Med      !! QUEtiapine (SEROquel) 100 mg tablet Take 100 mg by mouth daily at bedtime as needed, Starting Thu 6/1/2023, Historical Med      !! QUEtiapine (SEROquel) 50 mg tablet Take 1 tablet (50 mg total) by mouth in the morning, Starting Tue 4/4/2023, Normal      !! rOPINIRole (REQUIP) 0.5 mg tablet Take 1 tablet (0.5 mg total) by mouth in the morning, Starting Tue 4/4/2023, Normal      !! rOPINIRole (REQUIP) 1 mg tablet TAKE ONE TABLET BY MOUTH TWO TIMES A DAY AS NEEDED, Historical Med      thiamine 100 MG tablet Take 1 tablet (100 mg total) by mouth daily, Starting Tue 4/4/2023, Normal      traZODone (DESYREL) 100 mg tablet Take 1 tablet (100 mg total) by mouth in the morning, Starting Tue 4/4/2023, Normal       !! - Potential duplicate medications found. Please discuss with provider.          No discharge procedures on file.    PDMP Review         Value Time User    PDMP Reviewed  Yes 3/3/2023 10:22 AM Aminata Lin MD            ED Provider  Electronically Signed by             Mohan Champion DO  12/20/23 3899

## 2023-12-26 ENCOUNTER — HOSPITAL ENCOUNTER (EMERGENCY)
Facility: HOSPITAL | Age: 33
Discharge: HOME | End: 2023-12-26
Attending: STUDENT IN AN ORGANIZED HEALTH CARE EDUCATION/TRAINING PROGRAM
Payer: COMMERCIAL

## 2023-12-26 VITALS
SYSTOLIC BLOOD PRESSURE: 102 MMHG | HEART RATE: 78 BPM | DIASTOLIC BLOOD PRESSURE: 70 MMHG | RESPIRATION RATE: 20 BRPM | OXYGEN SATURATION: 98 %

## 2023-12-26 DIAGNOSIS — F10.10 ALCOHOL ABUSE: Primary | ICD-10-CM

## 2023-12-26 LAB
ALBUMIN SERPL BCP-MCNC: 4 G/DL (ref 3.5–5)
ALP SERPL-CCNC: 52 U/L (ref 34–104)
ALT SERPL W P-5'-P-CCNC: 149 U/L (ref 7–52)
ANION GAP SERPL CALCULATED.3IONS-SCNC: 12 MMOL/L
AST SERPL W P-5'-P-CCNC: 152 U/L (ref 13–39)
BASOPHILS # BLD AUTO: 0.04 THOUSANDS/ÂΜL (ref 0–0.1)
BASOPHILS NFR BLD AUTO: 1 % (ref 0–1)
BILIRUB SERPL-MCNC: 0.31 MG/DL (ref 0.2–1)
BUN SERPL-MCNC: 12 MG/DL (ref 5–25)
CALCIUM SERPL-MCNC: 8.7 MG/DL (ref 8.4–10.2)
CHLORIDE SERPL-SCNC: 106 MMOL/L (ref 96–108)
CO2 SERPL-SCNC: 27 MMOL/L (ref 21–32)
CREAT SERPL-MCNC: 0.78 MG/DL (ref 0.6–1.3)
EOSINOPHIL # BLD AUTO: 0.05 THOUSAND/ÂΜL (ref 0–0.61)
EOSINOPHIL NFR BLD AUTO: 1 % (ref 0–6)
ERYTHROCYTE [DISTWIDTH] IN BLOOD BY AUTOMATED COUNT: 12.1 % (ref 11.6–15.1)
ETHANOL SERPL-MCNC: 274 MG/DL
EXT PREGNANCY TEST URINE: NEGATIVE
EXT. CONTROL: NORMAL
GFR SERPL CREATININE-BSD FRML MDRD: 100 ML/MIN/1.73SQ M
GLUCOSE SERPL-MCNC: 115 MG/DL (ref 65–140)
HCT VFR BLD AUTO: 42.1 % (ref 34.8–46.1)
HGB BLD-MCNC: 14.3 G/DL (ref 11.5–15.4)
IMM GRANULOCYTES # BLD AUTO: 0.02 THOUSAND/UL (ref 0–0.2)
IMM GRANULOCYTES NFR BLD AUTO: 0 % (ref 0–2)
LYMPHOCYTES # BLD AUTO: 2.69 THOUSANDS/ÂΜL (ref 0.6–4.47)
LYMPHOCYTES NFR BLD AUTO: 43 % (ref 14–44)
MAGNESIUM SERPL-MCNC: 1.9 MG/DL (ref 1.9–2.7)
MCH RBC QN AUTO: 30.5 PG (ref 26.8–34.3)
MCHC RBC AUTO-ENTMCNC: 34 G/DL (ref 31.4–37.4)
MCV RBC AUTO: 90 FL (ref 82–98)
MONOCYTES # BLD AUTO: 0.34 THOUSAND/ÂΜL (ref 0.17–1.22)
MONOCYTES NFR BLD AUTO: 5 % (ref 4–12)
NEUTROPHILS # BLD AUTO: 3.18 THOUSANDS/ÂΜL (ref 1.85–7.62)
NEUTS SEG NFR BLD AUTO: 50 % (ref 43–75)
NRBC BLD AUTO-RTO: 0 /100 WBCS
PLATELET # BLD AUTO: 201 THOUSANDS/UL (ref 149–390)
PMV BLD AUTO: 10.3 FL (ref 8.9–12.7)
POTASSIUM SERPL-SCNC: 3.6 MMOL/L (ref 3.5–5.3)
PROT SERPL-MCNC: 6.9 G/DL (ref 6.4–8.4)
RBC # BLD AUTO: 4.69 MILLION/UL (ref 3.81–5.12)
SODIUM SERPL-SCNC: 145 MMOL/L (ref 135–147)
WBC # BLD AUTO: 6.32 THOUSAND/UL (ref 4.31–10.16)

## 2023-12-26 PROCEDURE — 96361 HYDRATE IV INFUSION ADD-ON: CPT

## 2023-12-26 PROCEDURE — 99283 EMERGENCY DEPT VISIT LOW MDM: CPT

## 2023-12-26 PROCEDURE — 80053 COMPREHEN METABOLIC PANEL: CPT | Performed by: STUDENT IN AN ORGANIZED HEALTH CARE EDUCATION/TRAINING PROGRAM

## 2023-12-26 PROCEDURE — 82077 ASSAY SPEC XCP UR&BREATH IA: CPT | Performed by: STUDENT IN AN ORGANIZED HEALTH CARE EDUCATION/TRAINING PROGRAM

## 2023-12-26 PROCEDURE — 96360 HYDRATION IV INFUSION INIT: CPT

## 2023-12-26 PROCEDURE — 36415 COLL VENOUS BLD VENIPUNCTURE: CPT | Performed by: STUDENT IN AN ORGANIZED HEALTH CARE EDUCATION/TRAINING PROGRAM

## 2023-12-26 PROCEDURE — 83735 ASSAY OF MAGNESIUM: CPT | Performed by: STUDENT IN AN ORGANIZED HEALTH CARE EDUCATION/TRAINING PROGRAM

## 2023-12-26 PROCEDURE — 81025 URINE PREGNANCY TEST: CPT | Performed by: STUDENT IN AN ORGANIZED HEALTH CARE EDUCATION/TRAINING PROGRAM

## 2023-12-26 PROCEDURE — 85025 COMPLETE CBC W/AUTO DIFF WBC: CPT | Performed by: STUDENT IN AN ORGANIZED HEALTH CARE EDUCATION/TRAINING PROGRAM

## 2023-12-26 PROCEDURE — 99284 EMERGENCY DEPT VISIT MOD MDM: CPT | Performed by: STUDENT IN AN ORGANIZED HEALTH CARE EDUCATION/TRAINING PROGRAM

## 2023-12-26 RX ORDER — DIAZEPAM 5 MG/1
5 TABLET ORAL ONCE
Status: COMPLETED | OUTPATIENT
Start: 2023-12-26 | End: 2023-12-26

## 2023-12-26 RX ADMIN — SODIUM CHLORIDE 1000 ML: 0.9 INJECTION, SOLUTION INTRAVENOUS at 09:11

## 2023-12-26 RX ADMIN — DIAZEPAM 5 MG: 5 TABLET ORAL at 09:13

## 2023-12-26 NOTE — ED NOTES
12/26/23 @ 0921:  Naheed from [facility] acknowledges receipt of consult and will arrive asap.  Naheed reports that she is currently in [place].  MS Kevin

## 2023-12-26 NOTE — ED NOTES
12/26/23 @ 1200:  Naheed from [facility] completed assessment and offered patient admission to [facility] for ETOH treatment, but patient declined because she wanted to go home.  According to Naheed, the patient states she will call later today from home and is still considering admission to [facility].  Naheed will follow up post discharge from ED.  ED MD informed of discharge plan and was in agreement.      MS Kevin

## 2023-12-26 NOTE — ED PROVIDER NOTES
"History  Chief Complaint   Patient presents with    Detox Evaluation     Pt states \"I feel like shit\"  pt states she is an everyday drinker, drinks 'alot'  unsure when last drink was, pt states she thinks she wants detox      Patient is a 33-year-old female, past medical history including alcohol abuse, who presents to the emergency department requesting detox.  Patient states that she drinks daily.  Last drink was just prior to arrival.  Currently does not feel well.  Does have some nausea but no vomiting.  States she has withdrawn before in the past.  No other complaints or concerns.    Chart reviewed.  Patient has a family medicine visit on 9/5/2023.  Was seen at that time for anxiety, depression, chronic low back pain, and insomnia, as well as follow-up for alcohol abuse.  She had been to alcohol rehab for the second time in May to June of this year for 28 days..        Prior to Admission Medications   Prescriptions Last Dose Informant Patient Reported? Taking?   FLUoxetine (PROzac) 40 MG capsule   No No   Sig: Take 1 capsule (40 mg total) by mouth daily   Melatonin 10 MG TABS   No No   Sig: Take 1 tablet (10 mg total) by mouth daily at bedtime as needed (insomnia) Do NOT take with alcohol   QUEtiapine (SEROquel) 100 mg tablet   Yes No   Sig: Take 100 mg by mouth daily at bedtime as needed   QUEtiapine (SEROquel) 50 mg tablet   No No   Sig: Take 1 tablet (50 mg total) by mouth in the morning   busPIRone (BUSPAR) 15 mg tablet   No No   Sig: take 1 tablet by mouth three times a day   cloNIDine (CATAPRES) 0.1 mg tablet   Yes No   Sig: in the morning   disulfiram (ANTABUSE) 250 mg tablet   No No   Sig: Take 1 tablet (250 mg total) by mouth daily DO NOT TAKE WITH ALCOHOL   doxepin (SINEquan) 50 mg capsule   No No   Sig: take 1 capsule by mouth once daily at bedtime if needed  FOR SLEEP   folic acid (FOLVITE) 1 mg tablet   No No   Sig: Take 1 tablet (1 mg total) by mouth daily   hydrOXYzine pamoate (VISTARIL) 50 mg " capsule   No No   Sig: Take 1 capsule (50 mg total) by mouth daily at bedtime   methocarbamol (ROBAXIN) 750 mg tablet   No No   Sig: Take 1 tablet (750 mg total) by mouth every 6 (six) hours as needed for muscle spasms   naltrexone (REVIA) 50 mg tablet   No No   Sig: Take 1 tablet (50 mg total) by mouth in the morning   ondansetron (ZOFRAN-ODT) 4 mg disintegrating tablet   No No   Sig: Take 1 tablet (4 mg total) by mouth every 6 (six) hours as needed for nausea or vomiting   propranolol (INDERAL) 10 mg tablet   Yes No   Sig: Take 10 mg by mouth daily at bedtime as needed   rOPINIRole (REQUIP) 0.5 mg tablet   No No   Sig: Take 1 tablet (0.5 mg total) by mouth in the morning   rOPINIRole (REQUIP) 1 mg tablet   Yes No   Sig: TAKE ONE TABLET BY MOUTH TWO TIMES A DAY AS NEEDED   thiamine 100 MG tablet   No No   Sig: Take 1 tablet (100 mg total) by mouth daily   traZODone (DESYREL) 100 mg tablet   No No   Sig: Take 1 tablet (100 mg total) by mouth in the morning      Facility-Administered Medications: None       Past Medical History:   Diagnosis Date    ETOH abuse     Seizures (HCC)        History reviewed. No pertinent surgical history.    History reviewed. No pertinent family history.  I have reviewed and agree with the history as documented.    E-Cigarette/Vaping    E-Cigarette Use Current Every Day User     Start Date 22     Cartridges/Day 1     Comments 50mg      E-Cigarette/Vaping Substances    Nicotine Yes     THC No     CBD No     Flavoring Yes     Other No     Unknown No      Social History     Tobacco Use    Smoking status: Former     Current packs/day: 0.00     Average packs/day: 0.3 packs/day for 12.0 years (3.0 ttl pk-yrs)     Types: Cigarettes     Start date:      Quit date:      Years since quittin.9    Smokeless tobacco: Never   Vaping Use    Vaping status: Every Day    Start date: 2022    Substances: Nicotine, Flavoring   Substance Use Topics    Alcohol use: Yes     Comment: jorge deutsch  of vodka a day    Drug use: Not Currently     Comment: edibles       Review of Systems   Constitutional:  Negative for chills and fever.   Gastrointestinal:  Positive for nausea. Negative for vomiting.   All other systems reviewed and are negative.      Physical Exam  Physical Exam  Vitals and nursing note reviewed.   Constitutional:       General: She is not in acute distress.     Appearance: She is well-developed. She is not ill-appearing, toxic-appearing or diaphoretic.   HENT:      Head: Normocephalic and atraumatic.      Right Ear: External ear normal.      Left Ear: External ear normal.      Nose: Nose normal.   Eyes:      General: Lids are normal. No scleral icterus.  Cardiovascular:      Rate and Rhythm: Normal rate and regular rhythm.   Pulmonary:      Effort: Pulmonary effort is normal. No respiratory distress.      Breath sounds: No rales.   Musculoskeletal:         General: No deformity. Normal range of motion.      Cervical back: Normal range of motion and neck supple.   Skin:     General: Skin is warm and dry.   Neurological:      General: No focal deficit present.      Mental Status: She is alert.   Psychiatric:         Mood and Affect: Mood normal.         Behavior: Behavior normal.         Vital Signs  ED Triage Vitals [12/26/23 0843]   Temp Pulse Respirations Blood Pressure SpO2   -- 76 20 137/79 96 %      Temp src Heart Rate Source Patient Position - Orthostatic VS BP Location FiO2 (%)   -- Monitor Lying Left arm --      Pain Score       --           Vitals:    12/26/23 0843 12/26/23 0913 12/26/23 1015   BP: 137/79 114/64 102/70   Pulse: 76 74 78   Patient Position - Orthostatic VS: Lying           Visual Acuity      ED Medications  Medications   sodium chloride 0.9 % bolus 1,000 mL (0 mL Intravenous Stopped 12/26/23 1222)   diazepam (VALIUM) tablet 5 mg (5 mg Oral Given 12/26/23 0913)       Diagnostic Studies  Results Reviewed       Procedure Component Value Units Date/Time    POCT pregnancy,  urine [[id number]]  (Normal) Resulted: 12/26/23 1035    Lab Status: Final result Updated: 12/26/23 1035     EXT Preg Test, Ur Negative     Control Valid    Comprehensive metabolic panel [[id number]]  (Abnormal) Collected: 12/26/23 0911    Lab Status: Final result Specimen: Blood from Arm, Left Updated: 12/26/23 0940     Sodium 145 mmol/L      Potassium 3.6 mmol/L      Chloride 106 mmol/L      CO2 27 mmol/L      ANION GAP 12 mmol/L      BUN 12 mg/dL      Creatinine 0.78 mg/dL      Glucose 115 mg/dL      Calcium 8.7 mg/dL       U/L       U/L      Alkaline Phosphatase 52 U/L      Total Protein 6.9 g/dL      Albumin 4.0 g/dL      Total Bilirubin 0.31 mg/dL      eGFR 100 ml/min/1.73sq m     Narrative:      [employer] Kid[employer] guidelines for Chronic Kidney Disease (CKD):     Stage 1 with normal or high GFR (GFR > 90 mL/min/1.73 square meters)    Stage 2 Mild CKD (GFR = 60-89 mL/min/1.73 square meters)    Stage 3A Moderate CKD (GFR = 45-59 mL/min/1.73 square meters)    Stage 3B Moderate CKD (GFR = 30-44 mL/min/1.73 square meters)    Stage 4 Severe CKD (GFR = 15-29 mL/min/1.73 square meters)    Stage 5 End Stage CKD (GFR <15 mL/min/1.73 square meters)  Note: GFR calculation is accurate only with a steady state creatinine    Ethanol [[id number]]  (Abnormal) Collected: 12/26/23 0911    Lab Status: Final result Specimen: Blood from Arm, Left Updated: 12/26/23 0940     Ethanol Lvl 274 mg/dL     Magnesium [[id number]]  (Normal) Collected: 12/26/23 0911    Lab Status: Final result Specimen: Blood from Arm, Left Updated: 12/26/23 0940     Magnesium 1.9 mg/dL     CBC and differential [[id number]] Collected: 12/26/23 0911    Lab Status: Final result Specimen: Blood from Arm, Left Updated: 12/26/23 0923     WBC 6.32 Thousand/uL      RBC 4.69 Million/uL      Hemoglobin 14.3 g/dL      Hematocrit 42.1 %      MCV 90 fL      MCH 30.5 pg      MCHC 34.0 g/dL      RDW 12.1 %      MPV 10.3 fL      Platelets 201  "Thousands/uL      nRBC 0 /100 WBCs      Neutrophils Relative 50 %      Immat GRANS % 0 %      Lymphocytes Relative 43 %      Monocytes Relative 5 %      Eosinophils Relative 1 %      Basophils Relative 1 %      Neutrophils Absolute 3.18 Thousands/µL      Immature Grans Absolute 0.02 Thousand/uL      Lymphocytes Absolute 2.69 Thousands/µL      Monocytes Absolute 0.34 Thousand/µL      Eosinophils Absolute 0.05 Thousand/µL      Basophils Absolute 0.04 Thousands/µL                    No orders to display              Procedures  Procedures         ED Course  ED Course as of 12/26/23 1405   Tue Dec 26, 2023   0940 MEDICAL ALCOHOL(!): 274   0940 AST(!): 152  Likely secondary to alcohol abuse   1151 Discussed with [facility].  Patient not wanting to go inpatient at this time.  Will discharge.  Number for outpatient resources provided to patient.                                             Medical Decision Making  Patient is a 33 y.o. female who presents to the ED for alcohol abuse, requesting detox.  Patient is nontoxic and well-appearing.  Vitals are stable.  Physical exam is unremarkable..    Differential includes but is not limited to: Alcohol abuse.  Low suspicion for withdrawal at this time.  Doubt other intoxication.    Plan: Labs, IV fluids, symptomatic treatment, warm handoff                 Portions of the record may have been created with voice recognition software. Occasional wrong word or \"sound a like\" substitutions may have occurred due to the inherent limitations of voice recognition software. Read the chart carefully and recognize, using context, where substitutions have occurred.    Problems Addressed:  Alcohol abuse: acute illness or injury    Amount and/or Complexity of Data Reviewed  External Data Reviewed: notes.  Labs: ordered. Decision-making details documented in ED Course.    Risk  OTC drugs.  Prescription drug management.             Disposition  Final diagnoses:   Alcohol abuse     Time reflects when " diagnosis was documented in both MDM as applicable and the Disposition within this note       Time User Action Codes Description Comment    12/26/2023 11:49 AM Mohan Champion Add [F10.10] Alcohol abuse           ED Disposition       ED Disposition   Discharge    Condition   Stable    Date/Time   Tue Dec 26, 2023 10:04 AM    Comment   Lynn Spencer discharge to home/self care.                   Follow-up Information       Follow up With Specialties Details Why Contact Info    [facility]  Call in 1 day  [phone]      Iona Cedillo MD Family Medicine   [address] [place] [address]  [phone]              Discharge Medication List as of 12/26/2023 11:51 AM        CONTINUE these medications which have NOT CHANGED    Details   busPIRone (BUSPAR) 15 mg tablet take 1 tablet by mouth three times a day, Starting Fri 12/1/2023, Normal      cloNIDine (CATAPRES) 0.1 mg tablet in the morning, Starting Thu 6/1/2023, Historical Med      disulfiram (ANTABUSE) 250 mg tablet Take 1 tablet (250 mg total) by mouth daily DO NOT TAKE WITH ALCOHOL, Starting Tue 4/4/2023, Normal      doxepin (SINEquan) 50 mg capsule take 1 capsule by mouth once daily at bedtime if needed  FOR SLEEP, Normal      FLUoxetine (PROzac) 40 MG capsule Take 1 capsule (40 mg total) by mouth daily, Starting Tue 9/5/2023, Normal      folic acid (FOLVITE) 1 mg tablet Take 1 tablet (1 mg total) by mouth daily, Starting Tue 4/4/2023, Normal      hydrOXYzine pamoate (VISTARIL) 50 mg capsule Take 1 capsule (50 mg total) by mouth daily at bedtime, Starting Tue 4/4/2023, Normal      Melatonin 10 MG TABS Take 1 tablet (10 mg total) by mouth daily at bedtime as needed (insomnia) Do NOT take with alcohol, Starting Tue 4/4/2023, Normal      methocarbamol (ROBAXIN) 750 mg tablet Take 1 tablet (750 mg total) by mouth every 6 (six) hours as needed for muscle spasms, Starting Tue 9/5/2023, Normal      naltrexone (REVIA) 50 mg tablet Take 1 tablet (50  mg total) by mouth in the morning, Starting Tue 4/4/2023, Normal      ondansetron (ZOFRAN-ODT) 4 mg disintegrating tablet Take 1 tablet (4 mg total) by mouth every 6 (six) hours as needed for nausea or vomiting, Starting Wed 12/20/2023, Normal      propranolol (INDERAL) 10 mg tablet Take 10 mg by mouth daily at bedtime as needed, Starting Thu 6/1/2023, Historical Med      !! QUEtiapine (SEROquel) 100 mg tablet Take 100 mg by mouth daily at bedtime as needed, Starting Thu 6/1/2023, Historical Med      !! QUEtiapine (SEROquel) 50 mg tablet Take 1 tablet (50 mg total) by mouth in the morning, Starting Tue 4/4/2023, Normal      !! rOPINIRole (REQUIP) 0.5 mg tablet Take 1 tablet (0.5 mg total) by mouth in the morning, Starting Tue 4/4/2023, Normal      !! rOPINIRole (REQUIP) 1 mg tablet TAKE ONE TABLET BY MOUTH TWO TIMES A DAY AS NEEDED, Historical Med      thiamine 100 MG tablet Take 1 tablet (100 mg total) by mouth daily, Starting Tue 4/4/2023, Normal      traZODone (DESYREL) 100 mg tablet Take 1 tablet (100 mg total) by mouth in the morning, Starting Tue 4/4/2023, Normal       !! - Potential duplicate medications found. Please discuss with provider.          No discharge procedures on file.    PDMP Review         Value Time User    PDMP Reviewed  Yes 3/3/2023 10:22 AM Aminata Lin MD            ED Provider  Electronically Signed by             Mohan Champion DO  12/26/23 2992

## 2023-12-26 NOTE — DISCHARGE INSTRUCTIONS
You were seen in the emergency department for alcohol use.  As discussed you may return for further treatment or you may call the outpatient numbers provided.  Return to the emergency department for any new or concerning symptoms.

## 2023-12-28 NOTE — ED CARE HANDOFF
[facility] Warm Handoff Outcome Note    Patient name Lynn Spencer  Location ED 13/ED 13 MRN [id number]  Age: 33 y.o.          Plan Type:  Warm Handoff                                                                                    Plan Date: 12/28/2023  Service:  ED Warm Handoff      Substance Use History:  ETOH    Warm Handoff Update:  LILIA met with patient    Warm Handoff Outcome: Patient Refused

## 2024-01-04 DIAGNOSIS — G47.00 INSOMNIA, UNSPECIFIED TYPE: ICD-10-CM

## 2024-01-04 RX ORDER — DOXEPIN HYDROCHLORIDE 50 MG/1
CAPSULE ORAL
Qty: 30 CAPSULE | Refills: 1 | Status: SHIPPED | OUTPATIENT
Start: 2024-01-04

## 2024-03-01 DIAGNOSIS — F41.1 GENERALIZED ANXIETY DISORDER: ICD-10-CM

## 2024-03-01 DIAGNOSIS — F32.9 MAJOR DEPRESSIVE DISORDER WITH CURRENT ACTIVE EPISODE, UNSPECIFIED DEPRESSION EPISODE SEVERITY, UNSPECIFIED WHETHER RECURRENT: ICD-10-CM

## 2024-03-01 RX ORDER — FLUOXETINE HYDROCHLORIDE 40 MG/1
40 CAPSULE ORAL DAILY
Qty: 90 CAPSULE | Refills: 1 | Status: SHIPPED | OUTPATIENT
Start: 2024-03-01

## 2024-11-29 ENCOUNTER — VBI (OUTPATIENT)
Dept: ADMINISTRATIVE | Facility: OTHER | Age: 34
End: 2024-11-29

## 2024-11-29 NOTE — TELEPHONE ENCOUNTER
11/29/24 9:50 AM     Chart reviewed for Pap Smear (HPV) aka Cervical Cancer Screening was/were submitted to the patient's insurance.     Stephanie Molina MA   [facility]

## 2025-06-25 ENCOUNTER — HOSPITAL ENCOUNTER (EMERGENCY)
Facility: HOSPITAL | Age: 35
Discharge: HOME | End: 2025-06-26
Attending: EMERGENCY MEDICINE | Admitting: EMERGENCY MEDICINE
Payer: COMMERCIAL

## 2025-06-25 DIAGNOSIS — F10.10 ALCOHOL ABUSE: Primary | ICD-10-CM

## 2025-06-25 DIAGNOSIS — F10.929 ALCOHOL INTOXICATION (HCC): ICD-10-CM

## 2025-06-25 LAB
ALBUMIN SERPL BCG-MCNC: 4.2 G/DL (ref 3.5–5)
ALP SERPL-CCNC: 54 U/L (ref 34–104)
ALT SERPL W P-5'-P-CCNC: 142 U/L (ref 7–52)
ANION GAP SERPL CALCULATED.3IONS-SCNC: 9 MMOL/L (ref 4–13)
AST SERPL W P-5'-P-CCNC: 139 U/L (ref 13–39)
BASOPHILS # BLD AUTO: 0.06 THOUSANDS/ÂΜL (ref 0–0.1)
BASOPHILS NFR BLD AUTO: 1 % (ref 0–1)
BILIRUB SERPL-MCNC: 0.3 MG/DL (ref 0.2–1)
BUN SERPL-MCNC: 10 MG/DL (ref 5–25)
CALCIUM SERPL-MCNC: 8.7 MG/DL (ref 8.4–10.2)
CHLORIDE SERPL-SCNC: 109 MMOL/L (ref 96–108)
CO2 SERPL-SCNC: 27 MMOL/L (ref 21–32)
CREAT SERPL-MCNC: 0.84 MG/DL (ref 0.6–1.3)
EOSINOPHIL # BLD AUTO: 0.05 THOUSAND/ÂΜL (ref 0–0.61)
EOSINOPHIL NFR BLD AUTO: 1 % (ref 0–6)
ERYTHROCYTE [DISTWIDTH] IN BLOOD BY AUTOMATED COUNT: 15.6 % (ref 11.6–15.1)
ETHANOL SERPL-MCNC: 359 MG/DL
GFR SERPL CREATININE-BSD FRML MDRD: 90 ML/MIN/1.73SQ M
GLUCOSE SERPL-MCNC: 89 MG/DL (ref 65–140)
HCT VFR BLD AUTO: 43.9 % (ref 34.8–46.1)
HGB BLD-MCNC: 14.1 G/DL (ref 11.5–15.4)
IMM GRANULOCYTES # BLD AUTO: 0.02 THOUSAND/UL (ref 0–0.2)
IMM GRANULOCYTES NFR BLD AUTO: 0 % (ref 0–2)
LYMPHOCYTES # BLD AUTO: 3.13 THOUSANDS/ÂΜL (ref 0.6–4.47)
LYMPHOCYTES NFR BLD AUTO: 48 % (ref 14–44)
MAGNESIUM SERPL-MCNC: 2.2 MG/DL (ref 1.9–2.7)
MCH RBC QN AUTO: 29.4 PG (ref 26.8–34.3)
MCHC RBC AUTO-ENTMCNC: 32.1 G/DL (ref 31.4–37.4)
MCV RBC AUTO: 92 FL (ref 82–98)
MONOCYTES # BLD AUTO: 0.99 THOUSAND/ÂΜL (ref 0.17–1.22)
MONOCYTES NFR BLD AUTO: 16 % (ref 4–12)
NEUTROPHILS # BLD AUTO: 2.15 THOUSANDS/ÂΜL (ref 1.85–7.62)
NEUTS SEG NFR BLD AUTO: 34 % (ref 43–75)
NRBC BLD AUTO-RTO: 0 /100 WBCS
PLATELET # BLD AUTO: 261 THOUSANDS/UL (ref 149–390)
PMV BLD AUTO: 9.7 FL (ref 8.9–12.7)
POTASSIUM SERPL-SCNC: 4 MMOL/L (ref 3.5–5.3)
PROT SERPL-MCNC: 7.4 G/DL (ref 6.4–8.4)
RBC # BLD AUTO: 4.8 MILLION/UL (ref 3.81–5.12)
SODIUM SERPL-SCNC: 145 MMOL/L (ref 135–147)
WBC # BLD AUTO: 6.4 THOUSAND/UL (ref 4.31–10.16)

## 2025-06-25 PROCEDURE — 82077 ASSAY SPEC XCP UR&BREATH IA: CPT | Performed by: EMERGENCY MEDICINE

## 2025-06-25 PROCEDURE — 93005 ELECTROCARDIOGRAM TRACING: CPT

## 2025-06-25 PROCEDURE — 80053 COMPREHEN METABOLIC PANEL: CPT | Performed by: EMERGENCY MEDICINE

## 2025-06-25 PROCEDURE — 96368 THER/DIAG CONCURRENT INF: CPT

## 2025-06-25 PROCEDURE — 96372 THER/PROPH/DIAG INJ SC/IM: CPT

## 2025-06-25 PROCEDURE — 96375 TX/PRO/DX INJ NEW DRUG ADDON: CPT

## 2025-06-25 PROCEDURE — 99285 EMERGENCY DEPT VISIT HI MDM: CPT

## 2025-06-25 PROCEDURE — 85025 COMPLETE CBC W/AUTO DIFF WBC: CPT | Performed by: EMERGENCY MEDICINE

## 2025-06-25 PROCEDURE — 36415 COLL VENOUS BLD VENIPUNCTURE: CPT | Performed by: EMERGENCY MEDICINE

## 2025-06-25 PROCEDURE — 83735 ASSAY OF MAGNESIUM: CPT | Performed by: EMERGENCY MEDICINE

## 2025-06-25 PROCEDURE — 96365 THER/PROPH/DIAG IV INF INIT: CPT

## 2025-06-25 PROCEDURE — 99285 EMERGENCY DEPT VISIT HI MDM: CPT | Performed by: EMERGENCY MEDICINE

## 2025-06-25 RX ORDER — LORAZEPAM 2 MG/ML
2 INJECTION INTRAMUSCULAR ONCE
Status: DISCONTINUED | OUTPATIENT
Start: 2025-06-25 | End: 2025-06-25

## 2025-06-25 RX ORDER — CEFADROXIL 500 MG/1
1000 CAPSULE ORAL ONCE
Status: DISCONTINUED | OUTPATIENT
Start: 2025-06-25 | End: 2025-06-25

## 2025-06-25 RX ORDER — LORAZEPAM 2 MG/ML
2 INJECTION INTRAMUSCULAR ONCE
Status: COMPLETED | OUTPATIENT
Start: 2025-06-25 | End: 2025-06-25

## 2025-06-25 RX ORDER — OLANZAPINE 10 MG/2ML
10 INJECTION, POWDER, FOR SOLUTION INTRAMUSCULAR ONCE
Status: COMPLETED | OUTPATIENT
Start: 2025-06-25 | End: 2025-06-25

## 2025-06-25 RX ADMIN — FOLIC ACID 1 MG: 5 INJECTION, SOLUTION INTRAMUSCULAR; INTRAVENOUS; SUBCUTANEOUS at 14:42

## 2025-06-25 RX ADMIN — LORAZEPAM 2 MG: 2 INJECTION INTRAMUSCULAR; INTRAVENOUS at 13:26

## 2025-06-25 RX ADMIN — OLANZAPINE 10 MG: 10 INJECTION, POWDER, FOR SOLUTION INTRAMUSCULAR at 13:25

## 2025-06-25 RX ADMIN — LORAZEPAM 2 MG: 2 INJECTION INTRAMUSCULAR; INTRAVENOUS at 14:39

## 2025-06-25 RX ADMIN — THIAMINE HYDROCHLORIDE 100 MG: 100 INJECTION, SOLUTION INTRAMUSCULAR; INTRAVENOUS at 14:46

## 2025-06-25 RX ADMIN — SODIUM CHLORIDE 1000 ML: 0.9 INJECTION, SOLUTION INTRAVENOUS at 14:37

## 2025-06-25 NOTE — ED PROVIDER NOTES
Time reflects when diagnosis was documented in both MDM as applicable and the Disposition within this note       Time User Action Codes Description Comment    6/25/2025  4:54 PM Milena Villatoro Add [F10.10] Alcohol abuse     6/25/2025  4:54 PM Milena Villatoro Add [F10.929] Alcohol intoxication (HCC)           ED Disposition       ED Disposition   Transfer to Behavioral Health Condition   --    Date/Time   Wed Jun 25, 2025  4:54 PM    Comment                   Assessment & Plan       Medical Decision Making  During her ED course we noted that she had several alcohol bottles next to her and she is begging to drink 1 more shot of alcohol before going into detox we counseled her that this cannot happen.  She is clinically intoxicated we did a breathalyzer and she did registered at 250 so she cannot make a decision to leave the ER at this point she is not competent.  Patient became agitated aggressive so we had to 4 point restrain her and give her sedatives.  Patient to be observed in the ED until she is clinically sober at which point reassessed if she really wants alcohol detox by [facility].  Signed out to Dr. CONNOLLY    Amount and/or Complexity of Data Reviewed  External Data Reviewed: notes.  Labs: ordered. Decision-making details documented in ED Course.    Risk  Prescription drug management.             Medications   cefadroxil (DURICEF) capsule 1,000 mg (has no administration in time range)   sodium chloride 0.9 % bolus 1,000 mL (0 mL Intravenous Stopped 6/25/25 1543)   thiamine (VITAMIN B1) 100 mg in dextrose 5 % 100 mL IVPB (0 mg Intravenous Stopped 6/25/25 1543)   folic acid 1 mg in sodium chloride 0.9 % 50 mL IVPB (0 mg Intravenous Stopped 6/25/25 1543)   LORazepam (ATIVAN) injection 2 mg (2 mg Intravenous Given 6/25/25 1439)   LORazepam (ATIVAN) injection 2 mg (2 mg Intramuscular Given 6/25/25 1326)   OLANZapine (ZyPREXA) IM injection 10 mg (10 mg Intramuscular Given 6/25/25 1325)       ED Risk Strat Scores                     No data recorded                            History of Present Illness       Chief Complaint   Patient presents with    Detox Evaluation     Here for detox from vodka. Sometimes wakes up and drinks a bottle of vodka. Last drink before walking in door       Past Medical History[1]   Past Surgical History[2]   Family History[3]   Social History[4]   E-Cigarette/Vaping    E-Cigarette Use Current Every Day User     Start Date 12/6/22     Cartridges/Day 1     Comments 50mg       E-Cigarette/Vaping Substances    Nicotine Yes     THC No     CBD No     Flavoring Yes     Other No     Unknown No       I have reviewed and agree with the history as documented.     35-year-old female history of alcohol abuse very depressed seeking alcohol detox initially.  Admits to anxiety some depressive thoughts drinks vodka regularly.  Has not been trying to cut down denies any nausea vomiting hallucinations formication or any other symptoms.  No other illicit drug use.      History provided by:  Patient   used: No        Review of Systems   Constitutional: Negative.    HENT: Negative.     Eyes: Negative.    Respiratory: Negative.     Cardiovascular: Negative.    Gastrointestinal: Negative.    Endocrine: Negative.    Genitourinary: Negative.    Musculoskeletal: Negative.    Skin: Negative.    Allergic/Immunologic: Negative.    Neurological: Negative.    Hematological: Negative.    Psychiatric/Behavioral: Negative.          Depressed intoxicated   All other systems reviewed and are negative.          Objective       ED Triage Vitals   Temperature Pulse Blood Pressure Respirations SpO2 Patient Position - Orthostatic VS   06/25/25 1119 06/25/25 1119 06/25/25 1119 06/25/25 1119 06/25/25 1119 06/25/25 1119   (!) 97.2 °F (36.2 °C) (!) 139 (!) 157/106 (!) 28 95 % Sitting      Temp Source Heart Rate Source BP Location FiO2 (%) Pain Score    06/25/25 1119 06/25/25 1400 06/25/25 1119 -- 06/25/25 1119    Tympanic  Monitor Right arm  No Pain      Vitals      Date and Time Temp Pulse SpO2 Resp BP Pain Score FACES Pain Rating User   06/25/25 1600 -- 94 95 % 14 103/56 -- -- MD   06/25/25 1530 -- 103 94 % 14 108/62 -- -- MB   06/25/25 1445 -- 105 92 % 15 106/68 -- -- MD   06/25/25 1400 -- 105 -- 15 112/69 -- -- MD   06/25/25 1217 -- 114 95 % -- 125/86 -- -- MDD   06/25/25 1119 97.2 °F (36.2 °C) 139 95 % 28 157/106 No Pain -- LS            Physical Exam  Vitals and nursing note reviewed.   Constitutional:       Appearance: Normal appearance.   HENT:      Head: Normocephalic and atraumatic.      Nose: Nose normal.      Mouth/Throat:      Mouth: Mucous membranes are moist.     Eyes:      Extraocular Movements: Extraocular movements intact.      Pupils: Pupils are equal, round, and reactive to light.       Cardiovascular:      Rate and Rhythm: Normal rate and regular rhythm.   Pulmonary:      Effort: Pulmonary effort is normal.      Breath sounds: Normal breath sounds.   Abdominal:      General: Abdomen is flat. Bowel sounds are normal.      Palpations: Abdomen is soft.     Musculoskeletal:         General: Normal range of motion.      Cervical back: Normal range of motion and neck supple.     Skin:     General: Skin is warm.      Capillary Refill: Capillary refill takes less than 2 seconds.     Neurological:      General: No focal deficit present.      Mental Status: She is alert and oriented to person, place, and time. Mental status is at baseline.     Psychiatric:         Mood and Affect: Mood normal.         Thought Content: Thought content normal.      Comments: Patient appears intoxicated and depressed         Results Reviewed       Procedure Component Value Units Date/Time    Ethanol [[id number]]  (Abnormal) Collected: 06/25/25 1432    Lab Status: Final result Specimen: Blood from Arm, Left Updated: 06/25/25 1455     Ethanol Lvl 359 mg/dL     Comprehensive metabolic panel [[id number]]  (Abnormal) Collected: 06/25/25 1432    Lab  Status: Final result Specimen: Blood from Arm, Left Updated: 06/25/25 1455     Sodium 145 mmol/L      Potassium 4.0 mmol/L      Chloride 109 mmol/L      CO2 27 mmol/L      ANION GAP 9 mmol/L      BUN 10 mg/dL      Creatinine 0.84 mg/dL      Glucose 89 mg/dL      Calcium 8.7 mg/dL       U/L       U/L      Alkaline Phosphatase 54 U/L      Total Protein 7.4 g/dL      Albumin 4.2 g/dL      Total Bilirubin 0.30 mg/dL      eGFR 90 ml/min/1.73sq m     Narrative:      National Kid[employer] guidelines for Chronic Kidney Disease (CKD):     Stage 1 with normal or high GFR (GFR > 90 mL/min/1.73 square meters)    Stage 2 Mild CKD (GFR = 60-89 mL/min/1.73 square meters)    Stage 3A Moderate CKD (GFR = 45-59 mL/min/1.73 square meters)    Stage 3B Moderate CKD (GFR = 30-44 mL/min/1.73 square meters)    Stage 4 Severe CKD (GFR = 15-29 mL/min/1.73 square meters)    Stage 5 End Stage CKD (GFR <15 mL/min/1.73 square meters)  Note: GFR calculation is accurate only with a steady state creatinine    Magnesium [[id number]]  (Normal) Collected: 06/25/25 1432    Lab Status: Final result Specimen: Blood from Arm, Left Updated: 06/25/25 1455     Magnesium 2.2 mg/dL     CBC and differential [[id number]]  (Abnormal) Collected: 06/25/25 1432    Lab Status: Final result Specimen: Blood from Arm, Left Updated: 06/25/25 1439     WBC 6.40 Thousand/uL      RBC 4.80 Million/uL      Hemoglobin 14.1 g/dL      Hematocrit 43.9 %      MCV 92 fL      MCH 29.4 pg      MCHC 32.1 g/dL      RDW 15.6 %      MPV 9.7 fL      Platelets 261 Thousands/uL      nRBC 0 /100 WBCs      Segmented % 34 %      Immature Grans % 0 %      Lymphocytes % 48 %      Monocytes % 16 %      Eosinophils Relative 1 %      Basophils Relative 1 %      Absolute Neutrophils 2.15 Thousands/µL      Absolute Immature Grans 0.02 Thousand/uL      Absolute Lymphocytes 3.13 Thousands/µL      Absolute Monocytes 0.99 Thousand/µL      Eosinophils Absolute 0.05 Thousand/µL       Basophils Absolute 0.06 Thousands/µL     POCT pregnancy, urine [[id number]]     Lab Status: No result     Rapid drug screen, urine [[id number]]     Lab Status: No result Specimen: Urine     UA (URINE) with reflex to Scope [[id number]]     Lab Status: No result Specimen: Urine             No orders to display       Procedures    ED Medication and Procedure Management   Prior to Admission Medications   Prescriptions Last Dose Informant Patient Reported? Taking?   FLUoxetine (PROzac) 40 MG capsule   No No   Sig: take 1 capsule by mouth once daily   Melatonin 10 MG TABS   No No   Sig: Take 1 tablet (10 mg total) by mouth daily at bedtime as needed (insomnia) Do NOT take with alcohol   QUEtiapine (SEROquel) 100 mg tablet   Yes No   Sig: Take 100 mg by mouth daily at bedtime as needed   QUEtiapine (SEROquel) 50 mg tablet   No No   Sig: Take 1 tablet (50 mg total) by mouth in the morning   busPIRone (BUSPAR) 15 mg tablet   No No   Sig: take 1 tablet by mouth three times a day   cloNIDine (CATAPRES) 0.1 mg tablet   Yes No   Sig: in the morning   disulfiram (ANTABUSE) 250 mg tablet Not Taking  No No   Sig: Take 1 tablet (250 mg total) by mouth daily DO NOT TAKE WITH ALCOHOL   Patient not taking: Reported on 6/25/2025   doxepin (SINEquan) 50 mg capsule   No No   Sig: take 1 capsule by mouth once daily at bedtime if needed  FOR SLEEP   folic acid (FOLVITE) 1 mg tablet   No No   Sig: Take 1 tablet (1 mg total) by mouth daily   hydrOXYzine pamoate (VISTARIL) 50 mg capsule   No No   Sig: Take 1 capsule (50 mg total) by mouth daily at bedtime   methocarbamol (ROBAXIN) 750 mg tablet   No No   Sig: Take 1 tablet (750 mg total) by mouth every 6 (six) hours as needed for muscle spasms   naltrexone (REVIA) 50 mg tablet   No No   Sig: Take 1 tablet (50 mg total) by mouth in the morning   ondansetron (ZOFRAN-ODT) 4 mg disintegrating tablet   No No   Sig: Take 1 tablet (4 mg total) by mouth every 6 (six) hours as needed for nausea or  vomiting   propranolol (INDERAL) 10 mg tablet   Yes No   Sig: Take 10 mg by mouth daily at bedtime as needed   rOPINIRole (REQUIP) 0.5 mg tablet   No No   Sig: Take 1 tablet (0.5 mg total) by mouth in the morning   rOPINIRole (REQUIP) 1 mg tablet   Yes No   Sig: TAKE ONE TABLET BY MOUTH TWO TIMES A DAY AS NEEDED   thiamine 100 MG tablet   No No   Sig: Take 1 tablet (100 mg total) by mouth daily   traZODone (DESYREL) 100 mg tablet   No No   Sig: Take 1 tablet (100 mg total) by mouth in the morning      Facility-Administered Medications: None     Patient's Medications   Discharge Prescriptions    No medications on file     No discharge procedures on file.  ED SEPSIS DOCUMENTATION   Time reflects when diagnosis was documented in both MDM as applicable and the Disposition within this note       Time User Action Codes Description Comment    6/25/2025  4:54 PM Milena Villatoro [F10.10] Alcohol abuse     6/25/2025  4:54 PM Milena Villatoro [F10.929] Alcohol intoxication (HCC)                    Milena Villatoro DO  06/25/25 1341         [1]   Past Medical History:  Diagnosis Date    ETOH abuse     Seizures (HCC)    [2] No past surgical history on file.  [3] No family history on file.  [4]   Social History  Tobacco Use    Smoking status: Former     Current packs/day: 0.00     Average packs/day: 0.3 packs/day for 12.0 years (3.0 ttl pk-yrs)     Types: Cigarettes     Start date: 2010     Quit date: 2022     Years since quitting: 3.4    Smokeless tobacco: Never   Vaping Use    Vaping status: Every Day    Start date: 12/6/2022    Substances: Nicotine, Flavoring   Substance Use Topics    Alcohol use: Yes     Comment: vodka all day    Drug use: Not Currently        Milena Villatoro DO  06/25/25 4604

## 2025-06-25 NOTE — ED NOTES
Pt attached to tele monitor and pt O2 sat 90 % RA applied O2 2L NC. Continue to monitor pt.      Carolina Atkinson RN  06/25/25 4579

## 2025-06-25 NOTE — ED NOTES
Patient sound asleep, patient is off 4 point restraints, 1:1 attendant maintained for behavioral issues and intoxication     Zenaida Ying RN  06/25/25 4709

## 2025-06-25 NOTE — ED NOTES
Pt continue asking to have one shot before we start treatment/ putting IV meds. This RN and medical student at pt bedside explaining pt  that drinking is not allowed inside the hosp. Security called and at bedside. Pt wants to AMA .provider made aware. Pt breathalyzer results 0.252. Explained to the pt w/ positive ETOH she is not allowed to leave the facility  for he safety. Pt agitated and restless. Pt attempted to leave and screaming. Restraints applied. Pt attempted to bite velcro restraints and screaming. Continue to monitor pt.      Carolina Atkinson, YAMILET  06/25/25 2042

## 2025-06-25 NOTE — ED NOTES
Patient is with the medical student under Dr. Villatoro and RN's when we found vodka on her tote bag.  Pleading and crying to have another drink.  RN's reiterated that if she is here in the ED to get help she is not allowed to drink at all.  Explained to her that orders are in place.  Dr. Villatoro also came to speak to the patient.     Zenaida Ying RN  06/25/25 1300

## 2025-06-25 NOTE — ED NOTES
Pt has 5 empty bottles and 7 small bottles ( airplane size)  of alcohol.      Carolina Atkinson RN  06/25/25 3337

## 2025-06-25 NOTE — ED NOTES
Pt O2 sat 100% on 2L NC discontinue O2 and  continue to monitor monitor pt.     Carolina Atkinson RN  06/25/25 0535

## 2025-06-25 NOTE — ED NOTES
During this round patient found to be anxious and started crying expressing that she is trying very hard to get help for detox and at the same time really wants to keep her job.     Zenaida Ying RN  06/25/25 7414

## 2025-06-26 VITALS
DIASTOLIC BLOOD PRESSURE: 79 MMHG | RESPIRATION RATE: 14 BRPM | HEART RATE: 93 BPM | SYSTOLIC BLOOD PRESSURE: 110 MMHG | TEMPERATURE: 97 F | BODY MASS INDEX: 30.73 KG/M2 | OXYGEN SATURATION: 97 % | WEIGHT: 179 LBS

## 2025-06-26 LAB
ATRIAL RATE: 111 BPM
P AXIS: 37 DEGREES
PR INTERVAL: 120 MS
QRS AXIS: 99 DEGREES
QRSD INTERVAL: 86 MS
QT INTERVAL: 344 MS
QTC INTERVAL: 467 MS
T WAVE AXIS: 33 DEGREES
VENTRICULAR RATE: 111 BPM

## 2025-06-26 PROCEDURE — 93010 ELECTROCARDIOGRAM REPORT: CPT | Performed by: INTERNAL MEDICINE

## 2025-06-26 NOTE — ED NOTES
Patient asleep in stretcher in room at this time. Respirations appear to be even and non-labored, with no distress noted.      Lalita Aguirre RN  06/26/25 2848

## 2025-06-26 NOTE — ED NOTES
Per YAMILET Glasgow this patient's belongings are being held in the [facility].  Other items are with security.     Natasha Mendoza RN  06/25/25 2055

## 2025-06-26 NOTE — ED CARE HANDOFF
Emergency Department Sign Out Note        Sign out and transfer of care from Dr. Funes. See Separate Emergency Department note.     The patient, Lynn Spencer, was evaluated by the previous provider for Alcohol abuse.    Workup Completed:  Lab work    ED Course / Workup Pending (followup):  Patient signed out pending detox placement by [facility] no placement found at this time patient requesting to be discharged home will continue to follow-up with [facility] as an outpatient.        No data recorded                             Procedures  Medical Decision Making  Amount and/or Complexity of Data Reviewed  Labs: ordered.    Risk  Prescription drug management.            Disposition  Final diagnoses:   Alcohol abuse   Alcohol intoxication (HCC)     Time reflects when diagnosis was documented in both MDM as applicable and the Disposition within this note       Time User Action Codes Description Comment    6/25/2025  4:54 PM Milena Villatoro [F10.10] Alcohol abuse     6/25/2025  4:54 PM Milena Villatoro Add [F10.929] Alcohol intoxication (HCC)           ED Disposition       ED Disposition   Discharge    Condition   Stable    Date/Time   Thu Jun 26, 2025  9:05 AM    Comment                   Follow-up Information       Follow up With Specialties Details Why Contact Info    [facility]  In 1 week [phone]            Patient's Medications   Discharge Prescriptions    No medications on file     No discharge procedures on file.       ED Provider  Electronically Signed by     Ralph Valenzuela DO  06/26/25 0906

## 2025-06-30 ENCOUNTER — HOSPITAL ENCOUNTER (EMERGENCY)
Facility: HOSPITAL | Age: 35
Discharge: HOME | End: 2025-06-30
Attending: EMERGENCY MEDICINE | Admitting: EMERGENCY MEDICINE
Payer: COMMERCIAL

## 2025-06-30 VITALS
OXYGEN SATURATION: 95 % | TEMPERATURE: 99.1 F | RESPIRATION RATE: 17 BRPM | BODY MASS INDEX: 30.56 KG/M2 | HEART RATE: 128 BPM | SYSTOLIC BLOOD PRESSURE: 125 MMHG | DIASTOLIC BLOOD PRESSURE: 75 MMHG | WEIGHT: 179 LBS | HEIGHT: 64 IN

## 2025-06-30 DIAGNOSIS — F10.10 ALCOHOL ABUSE: Primary | ICD-10-CM

## 2025-06-30 PROCEDURE — 99283 EMERGENCY DEPT VISIT LOW MDM: CPT

## 2025-06-30 PROCEDURE — 99284 EMERGENCY DEPT VISIT MOD MDM: CPT | Performed by: EMERGENCY MEDICINE

## 2025-06-30 NOTE — ED NOTES
White duffle bag   3 empty airplane bottles alcohol  6 full airplane bottle alcohol  Wallet no cash  Phone   2 containers of pringles  [place]  Toiletry items-toothbrush q tips   Blank pants  White t shirt x 2   Sports bra x 4  Black purse, empty  White purse empty  Phone  w 2 cords  Vape  Pink rope  White make up bag w makeup   2 brushes 4 candy bars   Grey t shirt  Black shoes  Pink slippers   Maroon , green yellow sweatshirt  Underware 6   Pill box with multiple pills      Charo Preston RN  06/30/25 0209

## 2025-06-30 NOTE — ED PROVIDER NOTES
"Time reflects when diagnosis was documented in both MDM as applicable and the Disposition within this note       Time User Action Codes Description Comment    6/30/2025  8:50 PM Billie Molina Add [F10.10] Alcohol abuse           ED Disposition       ED Disposition   AMA    Condition   --    Date/Time   Mon Jun 30, 2025  8:50 PM    Comment   Date: 6/30/2025  Patient: Lynn Spencer  Admitted: 6/30/2025  5:17 PM  Attending Provider: Billie Molina MD    Lynn Spencer or her authorized caregiver has made the decision for the patient to leave the emergency department against the advi ce of the emergency department staff. She or her authorized caregiver has been informed and understands the inherent risks, including death, fall, injury, liver failure, withdrawal.  She or her authorized caregiver has decided to accept the responsib ility for this decision. Lynn Spencer and all necessary parties have been advised that she may return for further evaluation or treatment. Her condition at time of discharge was stable.  Lynn Spencer had current vital signs as follows:  BP  125/75   Pulse (!) 128   Temp 99.1 °F (37.3 °C) (Tympanic)   Resp 17   Ht 5' 4\" (1.626 m)   Wt 81.2 kg (179 lb)   LMP 06/18/2025 (Exact Date)                Assessment & Plan       Medical Decision Making  [facility] saw pt.  Apparently she did go to [place] the other day but left AMA or ? Got kicked out.  They are willing to take her back.  LILIA feels pt. Is too intoxicated to safely go by 90 minute Uber ride at this time so she would like pt. To stay here and go in the morning.  Pt. Then said she wanted to leave so again LILIA doesn't feel we should try transport at this time.  2045 - pt. Has been sleeping in her room since LILIA left.  She is now up alert and ambulatory outside of her room and demanding her alcohol bottles that she came in with.  Nurse said policy is they have to dump alcohol brought in which they did.  Pt. Advised and " she was angry.  She wants to leave.  She says she is not ready to go to detox.  Gait is steady, speech is clear.  She will sign out AMA.             Medications - No data to display    ED Risk Strat Scores                    No data recorded        SBIRT 22yo+      Flowsheet Row Most Recent Value   Initial Alcohol Screen: US AUDIT-C     1. How often do you have a drink containing alcohol? 0 Filed at: 06/30/2025 1649   2. How many drinks containing alcohol do you have on a typical day you are drinking?  5 Filed at: 06/30/2025 1649   Audit-C Score 5 Filed at: 06/30/2025 1649   [place]: How many times in the past year have you...    Used an illegal drug or used a prescription medication for non-medical reasons? Never Filed at: 06/30/2025 1649                            History of Present Illness       Chief Complaint   Patient presents with    Detox Evaluation     Pt here to get help for detox, been here for same in the past, as per security she threw away 4 small bottles of liquor in waiting room, pt refused to talk. Denies any sob, cp       Past Medical History[1]   Past Surgical History[2]   Family History[3]   Social History[4]   E-Cigarette/Vaping    E-Cigarette Use Current Every Day User     Start Date 12/6/22     Cartridges/Day 1     Comments 50mg       E-Cigarette/Vaping Substances    Nicotine Yes     THC No     CBD No     Flavoring Yes     Other No     Unknown No       I have reviewed and agree with the history as documented.     34 yo female presents to ER requesting help with alcohol abuse.  She was here 5 days ago for intoxication, had to be sedated and restrained overnight.  Apparently [facility] did not feel she was appropriate for their detox and pt. Is not sure if [facility] came to see here then or not.  The pt. Last drank outside of ER just before coming in.  She denies injury or fall.  No pain anywhere.  No nausea or vomiting.      History provided by:  Patient   used: No    Detox  Evaluation  Associated symptoms: no abdominal pain and no vomiting        Review of Systems   Constitutional:  Negative for fever.   Respiratory:  Negative for cough.    Cardiovascular:  Negative for chest pain.   Gastrointestinal:  Negative for abdominal pain, diarrhea and vomiting.   Musculoskeletal:  Negative for back pain.   Skin:  Negative for rash and wound.           Objective       ED Triage Vitals [06/30/25 1646]   Temperature Pulse Blood Pressure Respirations SpO2 Patient Position - Orthostatic VS   99.1 °F (37.3 °C) (!) 135 142/85 18 93 % Sitting      Temp Source Heart Rate Source BP Location FiO2 (%) Pain Score    Tympanic Monitor Right arm -- --      Vitals      Date and Time Temp Pulse SpO2 Resp BP Pain Score FACES Pain Rating User   06/30/25 1849 -- 128 95 % 17 125/75 -- -- RG   06/30/25 1825 -- 131 95 % 43 -- -- -- RG   06/30/25 1730 -- 124 93 % 21 130/93 -- -- AM   06/30/25 1646 99.1 °F (37.3 °C) 135 93 % 18 142/85 -- -- AM            Physical Exam  Vitals and nursing note reviewed.   Constitutional:       General: She is not in acute distress.     Appearance: She is well-developed. She is not ill-appearing or diaphoretic.   HENT:      Head: Normocephalic and atraumatic.     Eyes:      Conjunctiva/sclera: Conjunctivae normal.      Pupils: Pupils are equal, round, and reactive to light.       Cardiovascular:      Rate and Rhythm: Regular rhythm. Tachycardia present.      Heart sounds: Normal heart sounds. No murmur heard.  Pulmonary:      Effort: Pulmonary effort is normal. No respiratory distress.      Breath sounds: Normal breath sounds.   Abdominal:      General: Bowel sounds are normal. There is no distension.      Palpations: Abdomen is soft.      Tenderness: There is no abdominal tenderness.     Musculoskeletal:         General: No deformity. Normal range of motion.      Cervical back: Normal range of motion and neck supple.      Right lower leg: No edema.      Left lower leg: No edema.      Skin:     General: Skin is warm and dry.      Coloration: Skin is not pale.      Findings: No rash.     Neurological:      General: No focal deficit present.      Mental Status: She is alert and oriented to person, place, and time.      Cranial Nerves: No cranial nerve deficit.      Motor: No weakness.      Comments: Not tremulous   Psychiatric:         Mood and Affect: Mood normal.         Behavior: Behavior normal.         Results Reviewed       None            No orders to display       Procedures    ED Medication and Procedure Management   Prior to Admission Medications   Prescriptions Last Dose Informant Patient Reported? Taking?   FLUoxetine (PROzac) 40 MG capsule   No No   Sig: take 1 capsule by mouth once daily   Melatonin 10 MG TABS   No No   Sig: Take 1 tablet (10 mg total) by mouth daily at bedtime as needed (insomnia) Do NOT take with alcohol   QUEtiapine (SEROquel) 100 mg tablet   Yes No   Sig: Take 100 mg by mouth daily at bedtime as needed   QUEtiapine (SEROquel) 50 mg tablet   No No   Sig: Take 1 tablet (50 mg total) by mouth in the morning   busPIRone (BUSPAR) 15 mg tablet   No No   Sig: take 1 tablet by mouth three times a day   cloNIDine (CATAPRES) 0.1 mg tablet   Yes No   Sig: in the morning   disulfiram (ANTABUSE) 250 mg tablet   No No   Sig: Take 1 tablet (250 mg total) by mouth daily DO NOT TAKE WITH ALCOHOL   Patient not taking: Reported on 6/25/2025   doxepin (SINEquan) 50 mg capsule   No No   Sig: take 1 capsule by mouth once daily at bedtime if needed  FOR SLEEP   folic acid (FOLVITE) 1 mg tablet   No No   Sig: Take 1 tablet (1 mg total) by mouth daily   hydrOXYzine pamoate (VISTARIL) 50 mg capsule   No No   Sig: Take 1 capsule (50 mg total) by mouth daily at bedtime   methocarbamol (ROBAXIN) 750 mg tablet   No No   Sig: Take 1 tablet (750 mg total) by mouth every 6 (six) hours as needed for muscle spasms   naltrexone (REVIA) 50 mg tablet   No No   Sig: Take 1 tablet (50 mg total) by  mouth in the morning   ondansetron (ZOFRAN-ODT) 4 mg disintegrating tablet   No No   Sig: Take 1 tablet (4 mg total) by mouth every 6 (six) hours as needed for nausea or vomiting   propranolol (INDERAL) 10 mg tablet   Yes No   Sig: Take 10 mg by mouth daily at bedtime as needed   rOPINIRole (REQUIP) 0.5 mg tablet   No No   Sig: Take 1 tablet (0.5 mg total) by mouth in the morning   rOPINIRole (REQUIP) 1 mg tablet   Yes No   Sig: TAKE ONE TABLET BY MOUTH TWO TIMES A DAY AS NEEDED   thiamine 100 MG tablet   No No   Sig: Take 1 tablet (100 mg total) by mouth daily   traZODone (DESYREL) 100 mg tablet   No No   Sig: Take 1 tablet (100 mg total) by mouth in the morning      Facility-Administered Medications: None     Discharge Medication List as of 6/30/2025  9:15 PM        CONTINUE these medications which have NOT CHANGED    Details   busPIRone (BUSPAR) 15 mg tablet take 1 tablet by mouth three times a day, Starting Fri 12/1/2023, Normal      cloNIDine (CATAPRES) 0.1 mg tablet in the morning, Starting Thu 6/1/2023, Historical Med      disulfiram (ANTABUSE) 250 mg tablet Take 1 tablet (250 mg total) by mouth daily DO NOT TAKE WITH ALCOHOL, Starting Tue 4/4/2023, Normal      doxepin (SINEquan) 50 mg capsule take 1 capsule by mouth once daily at bedtime if needed  FOR SLEEP, Normal      FLUoxetine (PROzac) 40 MG capsule take 1 capsule by mouth once daily, Starting Fri 3/1/2024, Normal      folic acid (FOLVITE) 1 mg tablet Take 1 tablet (1 mg total) by mouth daily, Starting Tue 4/4/2023, Normal      hydrOXYzine pamoate (VISTARIL) 50 mg capsule Take 1 capsule (50 mg total) by mouth daily at bedtime, Starting Tue 4/4/2023, Normal      Melatonin 10 MG TABS Take 1 tablet (10 mg total) by mouth daily at bedtime as needed (insomnia) Do NOT take with alcohol, Starting Tue 4/4/2023, Normal      methocarbamol (ROBAXIN) 750 mg tablet Take 1 tablet (750 mg total) by mouth every 6 (six) hours as needed for muscle spasms, Starting Tue  9/5/2023, Normal      naltrexone (REVIA) 50 mg tablet Take 1 tablet (50 mg total) by mouth in the morning, Starting Tue 4/4/2023, Normal      ondansetron (ZOFRAN-ODT) 4 mg disintegrating tablet Take 1 tablet (4 mg total) by mouth every 6 (six) hours as needed for nausea or vomiting, Starting Wed 12/20/2023, Normal      propranolol (INDERAL) 10 mg tablet Take 10 mg by mouth daily at bedtime as needed, Starting Thu 6/1/2023, Historical Med      !! QUEtiapine (SEROquel) 100 mg tablet Take 100 mg by mouth daily at bedtime as needed, Starting Thu 6/1/2023, Historical Med      !! QUEtiapine (SEROquel) 50 mg tablet Take 1 tablet (50 mg total) by mouth in the morning, Starting Tue 4/4/2023, Normal      !! rOPINIRole (REQUIP) 0.5 mg tablet Take 1 tablet (0.5 mg total) by mouth in the morning, Starting Tue 4/4/2023, Normal      !! rOPINIRole (REQUIP) 1 mg tablet TAKE ONE TABLET BY MOUTH TWO TIMES A DAY AS NEEDED, Historical Med      thiamine 100 MG tablet Take 1 tablet (100 mg total) by mouth daily, Starting Tue 4/4/2023, Normal      traZODone (DESYREL) 100 mg tablet Take 1 tablet (100 mg total) by mouth in the morning, Starting Tue 4/4/2023, Normal       !! - Potential duplicate medications found. Please discuss with provider.        No discharge procedures on file.  ED SEPSIS DOCUMENTATION   Time reflects when diagnosis was documented in both MDM as applicable and the Disposition within this note       Time User Action Codes Description Comment    6/30/2025  8:50 PM Billie Molina Add [F10.10] Alcohol abuse                      [1]   Past Medical History:  Diagnosis Date    ETOH abuse     Seizures (HCC)    [2] No past surgical history on file.  [3] No family history on file.  [4]   Social History  Tobacco Use    Smoking status: Former     Current packs/day: 0.00     Average packs/day: 0.3 packs/day for 12.0 years (3.0 ttl pk-yrs)     Types: Cigarettes     Start date: 2010     Quit date: 2022     Years since quitting: 3.4     Smokeless tobacco: Never   Vaping Use    Vaping status: Every Day    Start date: 12/6/2022    Substances: Nicotine, Flavoring   Substance Use Topics    Alcohol use: Yes     Comment: vodka all day    Drug use: Not Currently        Billie Molina MD  06/30/25 [id number]

## 2025-07-01 NOTE — ED NOTES
Received pt from off going RN.  Pt is set to wait for detox set up by LILIA for the morning.      2040:Pt requesting to leave and drink outside then be let back in.  This RN educated the pt that we do not allow this and alcohol is not permitted on the property and she can not go outside to drink and come back in.  Pt did have alcohol in bag and was dumped by this RN    2045:pt exited her room stating she wants her belonging to drink outside again MD in hallway with this RN Hola RN and security educating the pt that her alcohol was disposed of due to hospital policy.  Pt became aggravated stating she wants her stuff and to leave.  Pt reentered her room to change AMA explained and pt belongings outside her room and security at the doorway.     Minnie Simpson RN  06/30/25 2051